# Patient Record
Sex: FEMALE | Race: WHITE | Employment: UNEMPLOYED | URBAN - METROPOLITAN AREA
[De-identification: names, ages, dates, MRNs, and addresses within clinical notes are randomized per-mention and may not be internally consistent; named-entity substitution may affect disease eponyms.]

---

## 2017-10-11 ENCOUNTER — OFFICE VISIT (OUTPATIENT)
Dept: ONCOLOGY | Age: 27
End: 2017-10-11

## 2017-10-11 VITALS
WEIGHT: 214.6 LBS | OXYGEN SATURATION: 97 % | RESPIRATION RATE: 20 BRPM | DIASTOLIC BLOOD PRESSURE: 83 MMHG | TEMPERATURE: 97 F | SYSTOLIC BLOOD PRESSURE: 135 MMHG | HEIGHT: 64 IN | HEART RATE: 77 BPM | BODY MASS INDEX: 36.64 KG/M2

## 2017-10-11 DIAGNOSIS — D72.9 NEUTROPHILIA: Primary | ICD-10-CM

## 2017-10-11 DIAGNOSIS — D72.9 NEUTROPHILIA: ICD-10-CM

## 2017-10-11 DIAGNOSIS — F25.0 SCHIZOAFFECTIVE DISORDER, BIPOLAR TYPE (HCC): ICD-10-CM

## 2017-10-11 DIAGNOSIS — R71.8 MICROCYTOSIS: ICD-10-CM

## 2017-10-11 RX ORDER — HALOPERIDOL 1 MG/1
TABLET ORAL
Refills: 0 | COMMUNITY
Start: 2017-07-26 | End: 2019-10-02

## 2017-10-11 RX ORDER — HALOPERIDOL 5 MG/1
TABLET ORAL
Refills: 0 | COMMUNITY
Start: 2017-09-11 | End: 2020-12-18

## 2017-10-11 RX ORDER — AMLODIPINE BESYLATE 5 MG/1
TABLET ORAL
Refills: 5 | COMMUNITY
Start: 2017-09-26

## 2017-10-11 RX ORDER — BUSPIRONE HYDROCHLORIDE 10 MG/1
TABLET ORAL
Refills: 2 | COMMUNITY
Start: 2017-09-26 | End: 2020-12-18

## 2017-10-11 RX ORDER — SERTRALINE HYDROCHLORIDE 100 MG/1
TABLET, FILM COATED ORAL
Refills: 2 | COMMUNITY
Start: 2017-09-26 | End: 2020-12-18

## 2017-10-11 RX ORDER — LANOLIN ALCOHOL/MO/W.PET/CERES
CREAM (GRAM) TOPICAL
Refills: 5 | COMMUNITY
Start: 2017-09-26

## 2017-10-11 RX ORDER — CLONAZEPAM 0.5 MG/1
TABLET ORAL
Refills: 3 | COMMUNITY
Start: 2017-09-28 | End: 2019-10-02

## 2017-10-11 RX ORDER — HYDROCHLOROTHIAZIDE 12.5 MG/1
CAPSULE ORAL
Refills: 5 | COMMUNITY
Start: 2017-09-26 | End: 2020-12-18

## 2017-10-11 RX ORDER — HALOPERIDOL 2 MG/1
TABLET ORAL
Refills: 0 | COMMUNITY
Start: 2017-08-09 | End: 2019-10-02

## 2017-10-11 RX ORDER — HYDROXYZINE PAMOATE 50 MG/1
CAPSULE ORAL
Refills: 2 | COMMUNITY
Start: 2017-08-09 | End: 2019-10-02

## 2017-10-11 RX ORDER — TRAZODONE HYDROCHLORIDE 150 MG/1
TABLET ORAL
Refills: 2 | COMMUNITY
Start: 2017-09-26 | End: 2019-10-02

## 2017-10-11 RX ORDER — BENZTROPINE MESYLATE 0.5 MG/1
TABLET ORAL
Refills: 2 | COMMUNITY
Start: 2017-09-05 | End: 2020-12-18

## 2017-10-11 NOTE — PROGRESS NOTES
Hematology/Oncology Consult    REASON FOR CONSULT: Neutrophilia  REQUESTED BY: Ken Nascimento     HISTORY OF PRESENT ILLNESS: Ms. Massiel Galloway is a 33 y.o. female with Schizophrenia and HTN who presents for evaluation of neutrophilia. She is on multiple medications for schizophrenia - Cogentin, zoloft, buspar and haldol. Was noted to have a WBC count of 12.5 k on 7/17/17 on a routine physical without other cytopenias and predominant neutrophilia. She presents with her  today who provides some of the history. She denies any fevers, chills, NS, lumps or bumps, rashes, CP, SOB, HA, Diarrhea, abnormal bleeding, vaginal discharge, dysuria. Has gained some weight and appetite is stable,     She takes iron by mouth once a day for several months  Does not smoke  No blood disorders in the family      Past Medical History:   Diagnosis Date    Depression     Iron deficiency anemia     Self-mutilation     Social anxiety disorder        No past surgical history on file. No Known Allergies    Current Outpatient Prescriptions   Medication Sig Dispense Refill    amLODIPine (NORVASC) 5 mg tablet TK 1 T PO QD  5    benztropine (COGENTIN) 0.5 mg tablet TK 1 T PO BID  2    clonazePAM (KLONOPIN) 0.5 mg tablet TK 1 T PO  QD PRN  3    ferrous sulfate 325 mg (65 mg iron) tablet TK 1 T PO 1 TIME A DAY  5    haloperidol (HALDOL) 5 mg tablet TK 1 T PO BID  0    haloperidol (HALDOL) 1 mg tablet TK 1 T PO BID  0    hydroCHLOROthiazide (MICROZIDE) 12.5 mg capsule TK 1 C PO QD  5    hydrOXYzine pamoate (VISTARIL) 50 mg capsule TK 1 C PO BID PRN  2    sertraline (ZOLOFT) 100 mg tablet TK 1 T PO QD  2    traZODone (DESYREL) 150 mg tablet TK 1 T PO HS PRN  2    paliperidone palmitate (INVEGA SUSTENNA) 156 mg/mL injection 117 mg by IntraMUSCular route once.  Every 4 weeks      busPIRone (BUSPAR) 10 mg tablet TK 1 T PO BID  2    haloperidol (HALDOL) 2 mg tablet TK 1 T PO BID  0       Social History     Social History    Marital status: UNKNOWN     Spouse name: N/A    Number of children: N/A    Years of education: N/A     Social History Main Topics    Smoking status: Never Smoker    Smokeless tobacco: None    Alcohol use No    Drug use: None    Sexual activity: Not Asked     Other Topics Concern    None     Social History Narrative     FH reviewed    ROS  A 12 point review of systems was obtained and is negative except as listed in HPI.   ECOG PS is 1      Physical Examination:   Visit Vitals    /83    Pulse 77    Temp 97 °F (36.1 °C)    Resp 20    Ht 5' 4\" (1.626 m)    Wt 214 lb 9.6 oz (97.3 kg)    LMP 08/11/2017 (Approximate)    SpO2 97%    BMI 36.84 kg/m2     General appearance - alert, well appearing, and in no distress, poor eye contact  Mental status - oriented to person, place, and time  Mouth - mucous membranes moist, pharynx normal without lesions  Neck - supple, no significant adenopathy  Lymphatics - no palpable lymphadenopathy, no hepatosplenomegaly  Chest - clear to auscultation, no wheezes, rales or rhonchi, symmetric air entry  Heart - normal rate, regular rhythm, normal S1, S2, no murmurs, rubs, clicks or gallops  Abdomen - soft, nontender, nondistended, no masses or organomegaly, bowel sounds present  Back exam - full range of motion, no tenderness, palpable spasm or pain on motion  Neurological - normal speech, no focal findings or movement disorder noted  Musculoskeletal - no joint tenderness, deformity or swelling  Extremities - peripheral pulses normal, no pedal edema, no clubbing or cyanosis  Skin - normal coloration and turgor, no rashes, no suspicious skin lesions noted    Results     Outside labs, notes and pertinent records reviewed   CBC 7/17/17    Hb 12.4 g/dl  MCV 70  WBC 12.5K  Plts 304k    ASSESSMENT  Ms. Alexandru Saurez is a 33 y.o. female with isolated mild neutrophilia and microcytosis    DISCUSSION  At this time, the etiology is uncertain and the differential diagnosis is quite broad. Neutrophilia can be caused by active cigarette smoking, stress (anxiety, pain, exercise, etc), inflammatory conditions, infection, medications, asplenia, malignancy, and myeloproliferative disorders. Occasionally, patients have chronic idiopathic neutrophilia in which no cause is identified and is a benign condition. 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 It is reassuring that she has no other cytopenias. Will obtain baseline labs to r/o some other etiologies    She also has inappropriate microcytosis which may indicate an underlying thalassemia. If however her iron stores are low in the face of a normal Hb- I will order additional tests to r/o an MPN      PLAN    - CBC diff, smear, hep b, hep c, HIV, LD, Retic, Iron profile    rtc 1 WEEK    Dheeraj Frye MD    Ms. Matute has a reminder for a \"due or due soon\" health maintenance. I have asked that she contact her primary care provider for follow-up on this health maintenance.

## 2017-10-17 ENCOUNTER — TELEPHONE (OUTPATIENT)
Dept: ONCOLOGY | Age: 27
End: 2017-10-17

## 2017-10-17 NOTE — TELEPHONE ENCOUNTER
Call to patient. Follow up appointment 10/18. Mail box full on mobile  Left message to return call on home phone. Lab results not found. Need to reschedule appointment if not drawn.

## 2017-10-18 ENCOUNTER — OFFICE VISIT (OUTPATIENT)
Dept: ONCOLOGY | Age: 27
End: 2017-10-18

## 2017-10-18 VITALS
TEMPERATURE: 97.2 F | OXYGEN SATURATION: 97 % | HEIGHT: 64 IN | BODY MASS INDEX: 36.45 KG/M2 | HEART RATE: 87 BPM | DIASTOLIC BLOOD PRESSURE: 88 MMHG | WEIGHT: 213.5 LBS | RESPIRATION RATE: 20 BRPM | SYSTOLIC BLOOD PRESSURE: 131 MMHG

## 2017-10-18 DIAGNOSIS — R71.8 MICROCYTOSIS: ICD-10-CM

## 2017-10-18 DIAGNOSIS — F25.0 SCHIZOAFFECTIVE DISORDER, BIPOLAR TYPE (HCC): ICD-10-CM

## 2017-10-18 DIAGNOSIS — D72.9 NEUTROPHILIA: Primary | ICD-10-CM

## 2017-10-18 LAB
BASOPHILS # BLD AUTO: 0 X10E3/UL (ref 0–0.2)
BASOPHILS NFR BLD AUTO: 0 %
EOSINOPHIL # BLD AUTO: 0.2 X10E3/UL (ref 0–0.4)
EOSINOPHIL NFR BLD AUTO: 2 %
ERYTHROCYTE [DISTWIDTH] IN BLOOD BY AUTOMATED COUNT: 14.5 % (ref 12.3–15.4)
FERRITIN SERPL-MCNC: 116 NG/ML (ref 15–150)
HBV SURFACE AG SERPL QL IA: NEGATIVE
HCT VFR BLD AUTO: 38.2 % (ref 34–46.6)
HCV AB S/CO SERPL IA: <0.1 S/CO RATIO (ref 0–0.9)
HGB BLD-MCNC: 12.4 G/DL (ref 11.1–15.9)
HIV 1+2 AB+HIV1 P24 AG SERPL QL IA: NON REACTIVE
IMM GRANULOCYTES # BLD: 0.1 X10E3/UL (ref 0–0.1)
IMM GRANULOCYTES NFR BLD: 1 %
IRON SATN MFR SERPL: 17 % (ref 15–55)
IRON SERPL-MCNC: 57 UG/DL (ref 27–159)
LDH SERPL-CCNC: 229 IU/L (ref 119–226)
LYMPHOCYTES # BLD AUTO: 3.2 X10E3/UL (ref 0.7–3.1)
LYMPHOCYTES NFR BLD AUTO: 24 %
MCH RBC QN AUTO: 23 PG (ref 26.6–33)
MCHC RBC AUTO-ENTMCNC: 32.5 G/DL (ref 31.5–35.7)
MCV RBC AUTO: 71 FL (ref 79–97)
MONOCYTES # BLD AUTO: 1.1 X10E3/UL (ref 0.1–0.9)
MONOCYTES NFR BLD AUTO: 9 %
NEUTROPHILS # BLD AUTO: 8.4 X10E3/UL (ref 1.4–7)
NEUTROPHILS NFR BLD AUTO: 64 %
PATH INTERP BLD-IMP: ABNORMAL
PATH REV BLD -IMP: ABNORMAL
PATH REV BLD -IMP: ABNORMAL
PATH REV BLD -IMP: NORMAL
PATHOLOGIST NAME: ABNORMAL
PLATELET # BLD AUTO: 301 X10E3/UL (ref 150–379)
RBC # BLD AUTO: 5.38 X10E6/UL (ref 3.77–5.28)
RETICS/RBC NFR AUTO: 1.3 % (ref 0.6–2.6)
TIBC SERPL-MCNC: 329 UG/DL (ref 250–450)
UIBC SERPL-MCNC: 272 UG/DL (ref 131–425)
VIT B12 SERPL-MCNC: 680 PG/ML (ref 211–946)
WBC # BLD AUTO: 13 X10E3/UL (ref 3.4–10.8)

## 2017-10-18 NOTE — PROGRESS NOTES
Hematology/Oncology follow up    REASON FOR VISIT: Neutrophilia  REQUESTED BY: Adama Trujillo     HISTORY OF PRESENT ILLNESS: Ms. Gigi Porter is a 33 y.o. female with Schizophrenia and HTN who presents for evaluation of neutrophilia. She is on multiple medications for schizophrenia - Cogentin, zoloft, buspar and haldol. Was noted to have a WBC count of 12.5 k on 7/17/17 on a routine physical without other cytopenias and predominant neutrophilia. Presents today to review additional blood work    She presents with her  today who provides some of the history. She denies any fevers, chills, NS, lumps or bumps, rashes, CP, SOB, HA, Diarrhea, abnormal bleeding, vaginal discharge, dysuria. Has gained some weight and appetite is stable, has not mutilated her skin or privates in 2 months though has a h/o this    She takes iron by mouth once a day for several months. Gives her some constipation  Does not smoke  No blood disorders in the family      Past Medical History:   Diagnosis Date    Depression     Iron deficiency anemia     Self-mutilation     Social anxiety disorder        No past surgical history on file. No Known Allergies    Current Outpatient Prescriptions   Medication Sig Dispense Refill    amLODIPine (NORVASC) 5 mg tablet TK 1 T PO QD  5    benztropine (COGENTIN) 0.5 mg tablet TK 1 T PO BID  2    busPIRone (BUSPAR) 10 mg tablet TK 1 T PO BID  2    clonazePAM (KLONOPIN) 0.5 mg tablet TK 1 T PO  QD PRN  3    ferrous sulfate 325 mg (65 mg iron) tablet TK 1 T PO 1 TIME A DAY  5    haloperidol (HALDOL) 5 mg tablet TK 1 T PO BID  0    hydroCHLOROthiazide (MICROZIDE) 12.5 mg capsule TK 1 C PO QD  5    hydrOXYzine pamoate (VISTARIL) 50 mg capsule TK 1 C PO BID PRN  2    sertraline (ZOLOFT) 100 mg tablet TK 1 T PO QD  2    traZODone (DESYREL) 150 mg tablet TK 1 T PO HS PRN  2    paliperidone palmitate (INVEGA SUSTENNA) 156 mg/mL injection 117 mg by IntraMUSCular route once.  Every 4 weeks  haloperidol (HALDOL) 2 mg tablet TK 1 T PO BID  0    haloperidol (HALDOL) 1 mg tablet TK 1 T PO BID  0       Social History     Social History    Marital status: UNKNOWN     Spouse name: N/A    Number of children: N/A    Years of education: N/A     Social History Main Topics    Smoking status: Never Smoker    Smokeless tobacco: Not on file    Alcohol use No    Drug use: Not on file    Sexual activity: Not on file     Other Topics Concern    Not on file     Social History Narrative     FH reviewed    ROS  A 12 point review of systems was obtained and is negative except as listed in HPI. ECOG PS is 1      Physical Examination:   Visit Vitals    /88    Pulse 87    Temp 97.2 °F (36.2 °C)    Resp 20    Ht 5' 4\" (1.626 m)    Wt 213 lb 8 oz (96.8 kg)    LMP 08/11/2017 (Approximate)    SpO2 97%    BMI 36.65 kg/m2     General appearance - alert, well appearing, and in no distress, poor eye contact  Mental status - oriented to person, place, and time  Mouth - mucous membranes moist, pharynx normal without lesions  Neck - supple, no significant adenopathy  Lymphatics - no palpable lymphadenopathy, no hepatosplenomegaly  Chest - clear to auscultation, no wheezes, rales or rhonchi, symmetric air entry  Extremities - peripheral pulses normal, no pedal edema, no clubbing or cyanosis  Skin - normal coloration and turgor, no rashes, no suspicious skin lesions noted    Results     Outside labs, notes and pertinent records reviewed   CBC 7/17/17    Hb 12.4 g/dl  MCV 70  WBC 12.5K  Plts 304k    10/11/17  Hb 12.4 g/dl  MCV 71  WBC 13K  Neutrophils 8400  Lymphs 3200    No immature cells    Iron profile:- TIBC 329, Iron sat 17%  Ferritin 116  Retic 1.3%  HIV  And Hep C negative, Hep b Negative          ASSESSMENT  Ms. Leonard Oconnell is a 33 y.o. female with isolated mild neutrophilia and microcytosis    DISCUSSION  Reviewed labs    She has mild iron deficiency without anemia and stable neutrophilia. Lymphocytes elevated slightly as well. No clear reactive etiology though she does have a h/o injuring self. 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 This does not lead to a risk of infections. Discussed that though we have not r/o an MPN this is unlikely. Will followover time and if concerning changes seen will consider a BM biopsy      PLAN    - CBC diff, iron profile, ferritin and CMP in 3 moths    Justin Zendejas MD    Ms. Matute has a reminder for a \"due or due soon\" health maintenance. I have asked that she contact her primary care provider for follow-up on this health maintenance.

## 2017-10-20 DIAGNOSIS — D50.8 OTHER IRON DEFICIENCY ANEMIA: ICD-10-CM

## 2017-10-20 DIAGNOSIS — D50.8 OTHER IRON DEFICIENCY ANEMIA: Primary | ICD-10-CM

## 2017-10-23 ENCOUNTER — DOCUMENTATION ONLY (OUTPATIENT)
Dept: ONCOLOGY | Age: 27
End: 2017-10-23

## 2018-01-04 LAB
ALBUMIN SERPL-MCNC: 4.7 G/DL (ref 3.5–5.5)
ALBUMIN/GLOB SERPL: 1.6 {RATIO} (ref 1.2–2.2)
ALP SERPL-CCNC: 79 IU/L (ref 39–117)
ALT SERPL-CCNC: 20 IU/L (ref 0–32)
AST SERPL-CCNC: 14 IU/L (ref 0–40)
BASOPHILS # BLD AUTO: 0 X10E3/UL (ref 0–0.2)
BASOPHILS NFR BLD AUTO: 0 %
BILIRUB SERPL-MCNC: <0.2 MG/DL (ref 0–1.2)
BUN SERPL-MCNC: 9 MG/DL (ref 6–20)
BUN/CREAT SERPL: 10 (ref 9–23)
CALCIUM SERPL-MCNC: 10 MG/DL (ref 8.7–10.2)
CHLORIDE SERPL-SCNC: 97 MMOL/L (ref 96–106)
CO2 SERPL-SCNC: 28 MMOL/L (ref 18–29)
CREAT SERPL-MCNC: 0.86 MG/DL (ref 0.57–1)
EOSINOPHIL # BLD AUTO: 0.1 X10E3/UL (ref 0–0.4)
EOSINOPHIL NFR BLD AUTO: 1 %
ERYTHROCYTE [DISTWIDTH] IN BLOOD BY AUTOMATED COUNT: 15.8 % (ref 12.3–15.4)
FERRITIN SERPL-MCNC: 163 NG/ML (ref 15–150)
GLOBULIN SER CALC-MCNC: 3 G/DL (ref 1.5–4.5)
GLUCOSE SERPL-MCNC: 82 MG/DL (ref 65–99)
HCT VFR BLD AUTO: 40.4 % (ref 34–46.6)
HGB BLD-MCNC: 12.9 G/DL (ref 11.1–15.9)
IMM GRANULOCYTES # BLD: 0 X10E3/UL (ref 0–0.1)
IMM GRANULOCYTES NFR BLD: 1 %
IRON SATN MFR SERPL: 14 % (ref 15–55)
IRON SERPL-MCNC: 49 UG/DL (ref 27–159)
LYMPHOCYTES # BLD AUTO: 3.2 X10E3/UL (ref 0.7–3.1)
LYMPHOCYTES NFR BLD AUTO: 24 %
MCH RBC QN AUTO: 22.6 PG (ref 26.6–33)
MCHC RBC AUTO-ENTMCNC: 31.9 G/DL (ref 31.5–35.7)
MCV RBC AUTO: 71 FL (ref 79–97)
MONOCYTES # BLD AUTO: 0.7 X10E3/UL (ref 0.1–0.9)
MONOCYTES NFR BLD AUTO: 6 %
MORPHOLOGY BLD-IMP: ABNORMAL
NEUTROPHILS # BLD AUTO: 9 X10E3/UL (ref 1.4–7)
NEUTROPHILS NFR BLD AUTO: 68 %
PLATELET # BLD AUTO: 347 X10E3/UL (ref 150–379)
POTASSIUM SERPL-SCNC: 4.4 MMOL/L (ref 3.5–5.2)
PROT SERPL-MCNC: 7.7 G/DL (ref 6–8.5)
RBC # BLD AUTO: 5.71 X10E6/UL (ref 3.77–5.28)
SODIUM SERPL-SCNC: 141 MMOL/L (ref 134–144)
TIBC SERPL-MCNC: 353 UG/DL (ref 250–450)
UIBC SERPL-MCNC: 304 UG/DL (ref 131–425)
WBC # BLD AUTO: 13 X10E3/UL (ref 3.4–10.8)

## 2018-01-09 LAB
ALBUMIN SERPL-MCNC: 4.6 G/DL (ref 3.5–5.5)
ALBUMIN/GLOB SERPL: 1.3 {RATIO} (ref 1.2–2.2)
ALP SERPL-CCNC: 84 IU/L (ref 39–117)
ALT SERPL-CCNC: 18 IU/L (ref 0–32)
AST SERPL-CCNC: 20 IU/L (ref 0–40)
BASOPHILS # BLD AUTO: 0 X10E3/UL (ref 0–0.2)
BASOPHILS NFR BLD AUTO: 0 %
BILIRUB SERPL-MCNC: <0.2 MG/DL (ref 0–1.2)
BUN SERPL-MCNC: 10 MG/DL (ref 6–20)
BUN/CREAT SERPL: 13 (ref 9–23)
CALCIUM SERPL-MCNC: 9.5 MG/DL (ref 8.7–10.2)
CHLORIDE SERPL-SCNC: 95 MMOL/L (ref 96–106)
CO2 SERPL-SCNC: 24 MMOL/L (ref 18–29)
CREAT SERPL-MCNC: 0.77 MG/DL (ref 0.57–1)
EOSINOPHIL # BLD AUTO: 0.1 X10E3/UL (ref 0–0.4)
EOSINOPHIL NFR BLD AUTO: 1 %
ERYTHROCYTE [DISTWIDTH] IN BLOOD BY AUTOMATED COUNT: 15.9 % (ref 12.3–15.4)
FERRITIN SERPL-MCNC: 213 NG/ML (ref 15–150)
GLOBULIN SER CALC-MCNC: 3.5 G/DL (ref 1.5–4.5)
GLUCOSE SERPL-MCNC: 99 MG/DL (ref 65–99)
HCT VFR BLD AUTO: 41.4 % (ref 34–46.6)
HGB BLD-MCNC: 13.1 G/DL (ref 11.1–15.9)
IMM GRANULOCYTES # BLD: 0 X10E3/UL (ref 0–0.1)
IMM GRANULOCYTES NFR BLD: 0 %
IRON SATN MFR SERPL: 13 % (ref 15–55)
IRON SERPL-MCNC: 41 UG/DL (ref 27–159)
LYMPHOCYTES # BLD AUTO: 2.9 X10E3/UL (ref 0.7–3.1)
LYMPHOCYTES NFR BLD AUTO: 21 %
MCH RBC QN AUTO: 23 PG (ref 26.6–33)
MCHC RBC AUTO-ENTMCNC: 31.6 G/DL (ref 31.5–35.7)
MCV RBC AUTO: 73 FL (ref 79–97)
MONOCYTES # BLD AUTO: 0.7 X10E3/UL (ref 0.1–0.9)
MONOCYTES NFR BLD AUTO: 5 %
NEUTROPHILS # BLD AUTO: 9.8 X10E3/UL (ref 1.4–7)
NEUTROPHILS NFR BLD AUTO: 73 %
PLATELET # BLD AUTO: 318 X10E3/UL (ref 150–379)
POTASSIUM SERPL-SCNC: 3.9 MMOL/L (ref 3.5–5.2)
PROT SERPL-MCNC: 8.1 G/DL (ref 6–8.5)
RBC # BLD AUTO: 5.7 X10E6/UL (ref 3.77–5.28)
SODIUM SERPL-SCNC: 139 MMOL/L (ref 134–144)
TIBC SERPL-MCNC: 318 UG/DL (ref 250–450)
UIBC SERPL-MCNC: 277 UG/DL (ref 131–425)
WBC # BLD AUTO: 13.5 X10E3/UL (ref 3.4–10.8)

## 2018-01-16 ENCOUNTER — OFFICE VISIT (OUTPATIENT)
Dept: ONCOLOGY | Age: 28
End: 2018-01-16

## 2018-01-16 VITALS
WEIGHT: 209.6 LBS | TEMPERATURE: 97.7 F | DIASTOLIC BLOOD PRESSURE: 80 MMHG | BODY MASS INDEX: 35.78 KG/M2 | OXYGEN SATURATION: 96 % | HEIGHT: 64 IN | RESPIRATION RATE: 16 BRPM | SYSTOLIC BLOOD PRESSURE: 125 MMHG | HEART RATE: 74 BPM

## 2018-01-16 DIAGNOSIS — F25.0 SCHIZOAFFECTIVE DISORDER, BIPOLAR TYPE (HCC): ICD-10-CM

## 2018-01-16 DIAGNOSIS — R71.8 MICROCYTOSIS: ICD-10-CM

## 2018-01-16 DIAGNOSIS — D72.9 NEUTROPHILIA: Primary | ICD-10-CM

## 2018-01-16 PROBLEM — F33.9 RECURRENT DEPRESSION (HCC): Status: ACTIVE | Noted: 2018-01-16

## 2018-01-16 NOTE — PROGRESS NOTES
Hematology/Oncology follow up    REASON FOR VISIT: Neutrophilia  REQUESTED BY: Ciara Rodriguez     HISTORY OF PRESENT ILLNESS: Ms. Mulugeta Jean is a 33 y.o. female with Schizophrenia and HTN who presents for follow up of neutrophilia. She is on multiple medications for schizophrenia - Cogentin, zoloft, buspar and haldol. Was noted to have a WBC count of 12.5 k on 7/17/17 on a routine physical without other cytopenias and predominant neutrophilia. Presents today to review additional blood work    She presents alone today. She denies any fevers, chills, NS, lumps or bumps, rashes, CP, SOB, HA, Diarrhea, abnormal bleeding, vaginal discharge, dysuria. Has gained some weight and appetite is stable, has not mutilated her skin or privates in 4 months though has a h/o this    She takes iron by mouth once a day for several months. Gives her some constipation  Does not smoke  No blood disorders in the family      Past Medical History:   Diagnosis Date    Depression     Iron deficiency anemia     Self-mutilation     Social anxiety disorder        No past surgical history on file. No Known Allergies    Current Outpatient Prescriptions   Medication Sig Dispense Refill    amLODIPine (NORVASC) 5 mg tablet TK 1 T PO QD  5    benztropine (COGENTIN) 0.5 mg tablet TK 1 T PO BID  2    busPIRone (BUSPAR) 10 mg tablet TK 1 T PO BID  2    clonazePAM (KLONOPIN) 0.5 mg tablet TK 1 T PO  QD PRN  3    ferrous sulfate 325 mg (65 mg iron) tablet TK 1 T PO 1 TIME A DAY  5    haloperidol (HALDOL) 5 mg tablet TK 1 T PO BID  0    hydroCHLOROthiazide (MICROZIDE) 12.5 mg capsule TK 1 C PO QD  5    sertraline (ZOLOFT) 100 mg tablet TK 1 T PO QD  2    traZODone (DESYREL) 150 mg tablet TK 1 T PO HS PRN  2    paliperidone palmitate (INVEGA SUSTENNA) 156 mg/mL injection 117 mg by IntraMUSCular route once.  Every 4 weeks      haloperidol (HALDOL) 2 mg tablet TK 1 T PO BID  0    haloperidol (HALDOL) 1 mg tablet TK 1 T PO BID  0    hydrOXYzine pamoate (VISTARIL) 50 mg capsule TK 1 C PO BID PRN  2       Social History     Social History    Marital status: UNKNOWN     Spouse name: N/A    Number of children: N/A    Years of education: N/A     Social History Main Topics    Smoking status: Never Smoker    Smokeless tobacco: Never Used    Alcohol use No    Drug use: None    Sexual activity: Not Asked     Other Topics Concern    None     Social History Narrative     FH reviewed    ROS  A  review of systems was obtained and is negative except as listed in HPI.   ECOG PS is 1      Physical Examination:   Visit Vitals    /80    Pulse 74    Temp 97.7 °F (36.5 °C)    Resp 16    Ht 5' 4\" (1.626 m)    Wt 209 lb 9.6 oz (95.1 kg)    LMP 11/01/2017 (Approximate)    SpO2 96%    BMI 35.98 kg/m2     General appearance - alert, well appearing, and in no distress, poor eye contact  Mental status - oriented to person, place, and time  Mouth - mucous membranes moist, pharynx normal without lesions  Neck - supple, no significant adenopathy  Lymphatics - no palpable lymphadenopathy, no hepatosplenomegaly  Chest - clear to auscultation, no wheezes, rales or rhonchi, symmetric air entry  Extremities - peripheral pulses normal, no pedal edema, no clubbing or cyanosis  Skin - normal coloration and turgor, no rashes, no suspicious skin lesions noted    Results     Outside labs, notes and pertinent records reviewed   CBC 7/17/17    Hb 12.4 g/dl  MCV 70  WBC 12.5K  Plts 304k    10/11/17  Hb 12.4 g/dl  MCV 71  WBC 13K  Neutrophils 8400  Lymphs 3200    No immature cells    Iron profile:- TIBC 329, Iron sat 17%  Ferritin 116  Retic 1.3%  HIV  And Hep C negative, Hep b Negative        1/8/18    Lab Results   Component Value Date/Time    Reticulocyte count 1.3 10/11/2017 02:59 PM    Iron % saturation 13 01/08/2018 12:00 AM    TIBC 318 01/08/2018 12:00 AM    Ferritin 213 01/08/2018 12:00 AM    Vitamin B12 680 10/11/2017 02:59 PM     10/11/2017 02:59 PM    Hep C Virus Ab <0.1 10/11/2017 02:59 PM    HIV SCREEN 4TH GENERATION WRFX Non Reactive 10/11/2017 02:59 PM     Has stable leukocytosis with no other CBC abnormalities. ASSESSMENT  Ms. Kavya Gusman is a 33 y.o. female with isolated mild neutrophilia and microcytosis. Neutrophilia has been stable now for 6 months. DISCUSSION  Reviewed labs    She has mild iron deficiency without anemia and stable neutrophilia. No clear reactive etiology though she does have a h/o injuring self. 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 This does not lead to a risk of infections. Discussed that though we have not r/o an MPN this is unlikely. Will followover time and if concerning changes seen will co nsider a BM biopsy. Noted that she has some microcytosis in the absence of anemia. She may have an underlying minor hemoglobinopathy. PLAN    -We will up with CBC diff  in 6 months    Ortiz Garcia MD    Ms. Matute has a reminder for a \"due or due soon\" health maintenance. I have asked that she contact her primary care provider for follow-up on this health maintenance.

## 2018-03-01 ENCOUNTER — HOSPITAL ENCOUNTER (OUTPATIENT)
Dept: ULTRASOUND IMAGING | Age: 28
Discharge: HOME | End: 2018-03-01
Payer: MEDICAID

## 2018-03-01 DIAGNOSIS — R22.9 SKIN SWELLING: ICD-10-CM

## 2018-03-01 PROCEDURE — 76882 US LMTD JT/FCL EVL NVASC XTR: CPT

## 2018-06-15 LAB
BASOPHILS # BLD AUTO: 0 X10E3/UL (ref 0–0.2)
BASOPHILS NFR BLD AUTO: 0 %
EOSINOPHIL # BLD AUTO: 0.2 X10E3/UL (ref 0–0.4)
EOSINOPHIL NFR BLD AUTO: 2 %
ERYTHROCYTE [DISTWIDTH] IN BLOOD BY AUTOMATED COUNT: 15.8 % (ref 12.3–15.4)
HCT VFR BLD AUTO: 40.6 % (ref 34–46.6)
HGB BLD-MCNC: 13 G/DL (ref 11.1–15.9)
IMM GRANULOCYTES # BLD: 0 X10E3/UL (ref 0–0.1)
IMM GRANULOCYTES NFR BLD: 0 %
LYMPHOCYTES # BLD AUTO: 3.3 X10E3/UL (ref 0.7–3.1)
LYMPHOCYTES NFR BLD AUTO: 28 %
MCH RBC QN AUTO: 23 PG (ref 26.6–33)
MCHC RBC AUTO-ENTMCNC: 32 G/DL (ref 31.5–35.7)
MCV RBC AUTO: 72 FL (ref 79–97)
MONOCYTES # BLD AUTO: 0.8 X10E3/UL (ref 0.1–0.9)
MONOCYTES NFR BLD AUTO: 7 %
NEUTROPHILS # BLD AUTO: 7.5 X10E3/UL (ref 1.4–7)
NEUTROPHILS NFR BLD AUTO: 63 %
PLATELET # BLD AUTO: 283 X10E3/UL (ref 150–379)
RBC # BLD AUTO: 5.65 X10E6/UL (ref 3.77–5.28)
WBC # BLD AUTO: 11.8 X10E3/UL (ref 3.4–10.8)

## 2018-06-21 ENCOUNTER — OFFICE VISIT (OUTPATIENT)
Dept: ONCOLOGY | Age: 28
End: 2018-06-21

## 2018-06-21 VITALS
HEART RATE: 85 BPM | RESPIRATION RATE: 20 BRPM | SYSTOLIC BLOOD PRESSURE: 124 MMHG | TEMPERATURE: 98 F | BODY MASS INDEX: 35.44 KG/M2 | OXYGEN SATURATION: 95 % | HEIGHT: 64 IN | WEIGHT: 207.6 LBS | DIASTOLIC BLOOD PRESSURE: 84 MMHG

## 2018-06-21 DIAGNOSIS — D72.9 NEUTROPHILIA: ICD-10-CM

## 2018-06-21 DIAGNOSIS — E66.01 SEVERE OBESITY (BMI 35.0-39.9): Primary | ICD-10-CM

## 2018-06-21 DIAGNOSIS — F25.0 SCHIZOAFFECTIVE DISORDER, BIPOLAR TYPE (HCC): ICD-10-CM

## 2018-06-21 DIAGNOSIS — R71.8 MICROCYTOSIS: ICD-10-CM

## 2018-06-21 DIAGNOSIS — F33.9 RECURRENT DEPRESSION (HCC): ICD-10-CM

## 2018-06-21 NOTE — PROGRESS NOTES
Hematology/Oncology follow up    REASON FOR VISIT: Neutrophilia  REQUESTED BY: May Beasley     HISTORY OF PRESENT ILLNESS: Ms. Brendan Galvan is a 33 y.o. female with Schizophrenia and HTN who presents for follow up of neutrophilia. She is on multiple medications for schizophrenia - Cogentin, zoloft, buspar and haldol. Was noted to have a WBC count of 12.5 k on 7/17/17 on a routine physical without other cytopenias and predominant neutrophilia. Counts have been stable and blood work unrevealing and hence being observed. Presents today for follow up. She presents alone today. She denies any fevers, chills, NS, lumps or bumps, rashes, CP, SOB, HA, Diarrhea, abnormal bleeding, vaginal discharge, dysuria. Thinks she has HPV    She takes iron by mouth twice a day for several months. Gives her some constipation  Does not smoke  No blood disorders in the family      Past Medical History:   Diagnosis Date    Depression     Iron deficiency anemia     Self-mutilation     Social anxiety disorder        No past surgical history on file. No Known Allergies    Current Outpatient Prescriptions   Medication Sig Dispense Refill    amLODIPine (NORVASC) 5 mg tablet TK 1 T PO QD  5    benztropine (COGENTIN) 0.5 mg tablet TK 1 T PO BID  2    busPIRone (BUSPAR) 10 mg tablet TK 1 T PO BID  2    clonazePAM (KLONOPIN) 0.5 mg tablet TK 1 T PO  QD PRN  3    ferrous sulfate 325 mg (65 mg iron) tablet TK 1 T PO 1 TIME A DAY  5    haloperidol (HALDOL) 5 mg tablet TK 1 T PO BID  0    hydroCHLOROthiazide (MICROZIDE) 12.5 mg capsule TK 1 C PO QD  5    sertraline (ZOLOFT) 100 mg tablet TK 1 T PO QD  2    traZODone (DESYREL) 150 mg tablet TK 1 T PO HS PRN  2    paliperidone palmitate (INVEGA SUSTENNA) 156 mg/mL injection 117 mg by IntraMUSCular route once.  Every 4 weeks      haloperidol (HALDOL) 2 mg tablet TK 1 T PO BID  0    haloperidol (HALDOL) 1 mg tablet TK 1 T PO BID  0    hydrOXYzine pamoate (VISTARIL) 50 mg capsule TK 1 C PO BID PRN  2       Social History     Social History    Marital status: SINGLE     Spouse name: N/A    Number of children: N/A    Years of education: N/A     Social History Main Topics    Smoking status: Never Smoker    Smokeless tobacco: Never Used    Alcohol use No    Drug use: None    Sexual activity: Not Asked     Other Topics Concern    None     Social History Narrative     FH reviewed    ROS  A  review of systems was obtained and is negative except as listed in HPI.   ECOG PS is 1      Physical Examination:   Visit Vitals    /84    Pulse 85    Temp 98 °F (36.7 °C)    Resp 20    Ht 5' 4\" (1.626 m)    Wt 207 lb 9.6 oz (94.2 kg)    SpO2 95%    BMI 35.63 kg/m2     General appearance - alert, well appearing, and in no distress, poor eye contact  Mental status - oriented to person, place, and time  Mouth - mucous membranes moist, pharynx normal without lesions  Neck - supple, no significant adenopathy  Lymphatics - no palpable lymphadenopathy, no hepatosplenomegaly  Chest - clear to auscultation, no wheezes, rales or rhonchi, symmetric air entry  Extremities - peripheral pulses normal, no pedal edema, no clubbing or cyanosis  Skin - normal coloration and turgor, no rashes, no suspicious skin lesions noted    Results     Outside labs, notes and pertinent records reviewed   CBC 7/17/17    Hb 12.4 g/dl  MCV 70  WBC 12.5K  Plts 304k    10/11/17  Hb 12.4 g/dl  MCV 71  WBC 13K  Neutrophils 8400  Lymphs 3200    No immature cells    Iron profile:- TIBC 329, Iron sat 17%  Ferritin 116  Retic 1.3%  HIV  And Hep C negative, Hep b Negative        1/8/18    Lab Results   Component Value Date/Time    Reticulocyte count 1.3 10/11/2017 02:59 PM    Iron % saturation 13 (L) 01/08/2018 12:00 AM    TIBC 318 01/08/2018 12:00 AM    Ferritin 213 (H) 01/08/2018 12:00 AM    Vitamin B12 680 10/11/2017 02:59 PM     (H) 10/11/2017 02:59 PM    Hep C Virus Ab <0.1 10/11/2017 02:59 PM    HIV SCREEN 4TH GENERATION WRFX Non Reactive 10/11/2017 02:59 PM     Recent Labs      06/14/18   1013  01/08/18   0000  01/03/18   1424  10/11/17   1459   WBC  11.8*  13.5*  13.0*  13.0*   HGB  13.0  13.1  12.9  12.4   HCT  40.6  41.4  40.4  38.2   PLT  283  318  347  301   MCV  72*  73*  71*  71*   ANEU  7.5*  9.8*  9.0*  8.4*         ASSESSMENT  Ms. Julienne Fabry is a 33 y.o. female with isolated mild neutrophilia and microcytosis. Neutrophilia has been stable now for 8 months. DISCUSSION  Reviewed labs    She has mild iron deficiency without anemia and stable neutrophilia. No clear reactive etiology. 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 This does not lead to a risk of infections. Discussed that though we have not r/o an MPN this is unlikely. Noted that she has some microcytosis in the absence of anemia. Borderline iron deficiency (iron sat 13%, ferritin however is normal), however she may have an underlying minor hemoglobinopathy. If and when she is pregnant she may need to have testing to r/o a hemoglobinopathy    I do not think we need a B,M biopsy given that WBC actually has decreased some    She will follow with her PCP with annual cbc. If there are new cytopenias or unexplained rise in WBC count will be happy to see her back    PLAN    RTC prjoseph Pike MD    Ms. Matute has a reminder for a \"due or due soon\" health maintenance. I have asked that she contact her primary care provider for follow-up on this health maintenance.

## 2019-04-23 ENCOUNTER — OFFICE VISIT (OUTPATIENT)
Dept: OBGYN CLINIC | Age: 29
End: 2019-04-23

## 2019-04-23 VITALS — WEIGHT: 220 LBS | BODY MASS INDEX: 37.76 KG/M2

## 2019-04-23 DIAGNOSIS — Z01.419 ENCOUNTER FOR GYNECOLOGICAL EXAMINATION WITHOUT ABNORMAL FINDING: Primary | ICD-10-CM

## 2019-04-23 NOTE — PROGRESS NOTES
Annual exam ages 25-60    Bobo Almaraz is a No obstetric history on file. ,  33 y.o. female [facility] [place] No LMP recorded. .    She presents for her annual checkup. She is having no significant problems. Concerned that she might have HPV; no hx HPV    With regard to the Gardasil vaccine, she has received all 3 injections. Menstrual status:    Her periods are light in flow. She is using one to two pads or tampons per day, usually regular and last 26-30 days. She denies dysmenorrhea. She reports no premenstrual symptoms. Contraception:    The current method of family planning is abstinence. Sexual history:     She  has no sexual activity history on file. .    Medical conditions:    Since her last annual GYN exam about a couple years ago, she has not the following changes in her health history: none. Pap and Mammogram History:    Her most recent Pap smear was normal, obtained 2 year(s) ago. Breast Cancer History/Substance Abuse: negative    Past Medical History:   Diagnosis Date    Depression     Hypertension     Iron deficiency anemia     Self-mutilation     Social anxiety disorder      Past Surgical History:   Procedure Laterality Date    HX OTHER SURGICAL      right shoulder surgery.        Current Outpatient Medications   Medication Sig Dispense Refill    amLODIPine (NORVASC) 5 mg tablet TK 1 T PO QD  5    busPIRone (BUSPAR) 10 mg tablet TK 1 T PO BID  2    benztropine (COGENTIN) 0.5 mg tablet TK 1 T PO BID  2    clonazePAM (KLONOPIN) 0.5 mg tablet TK 1 T PO  QD PRN  3    ferrous sulfate 325 mg (65 mg iron) tablet TK 1 T PO 1 TIME A DAY  5    haloperidol (HALDOL) 5 mg tablet TK 1 T PO BID  0    haloperidol (HALDOL) 2 mg tablet TK 1 T PO BID  0    haloperidol (HALDOL) 1 mg tablet TK 1 T PO BID  0    hydroCHLOROthiazide (MICROZIDE) 12.5 mg capsule TK 1 C PO QD  5    hydrOXYzine pamoate (VISTARIL) 50 mg capsule TK 1 C PO BID PRN  2    sertraline (ZOLOFT) 100 mg tablet TK 1 T PO QD  2    traZODone (DESYREL) 150 mg tablet TK 1 T PO HS PRN  2    paliperidone palmitate (INVEGA SUSTENNA) 156 mg/mL injection 117 mg by IntraMUSCular route once. Every 4 weeks       Allergies: Patient has no known allergies. Tobacco History:  reports that she has never smoked. She has never used smokeless tobacco.  Alcohol Abuse:  reports that she does not drink alcohol. Drug Abuse:  has no drug history on file. Family Medical/Cancer History: History reviewed. No pertinent family history.      Review of Systems - History obtained from the patient  Constitutional: negative for weight loss, fever, night sweats  HEENT: negative for hearing loss, earache, congestion, snoring, sorethroat  CV: negative for chest pain, palpitations, edema  Resp: negative for cough, shortness of breath, wheezing  GI: negative for change in bowel habits, abdominal pain, black or bloody stools  : negative for frequency, dysuria, hematuria, vaginal discharge  MSK: negative for back pain, joint pain, muscle pain  Breast: negative for breast lumps, nipple discharge, galactorrhea  Skin :negative for itching, rash, hives  Neuro: negative for dizziness, headache, confusion, weakness  Psych: negative for anxiety, depression, change in mood  Heme/lymph: negative for bleeding, bruising, pallor    Physical Exam    Visit Vitals  Wt 220 lb (99.8 kg)   BMI 37.76 kg/m²       Constitutional  · Appearance: well-nourished, well developed, alert, in no acute distress    HENT  · Head and Face: appears normal    Chest  · Respiratory Effort: breathing unlabored    Breasts  · Inspection of Breasts: breasts symmetrical, no skin changes, no discharge present, nipple appearance normal, no skin retraction present  · Palpation of Breasts and Axillae: no masses present on palpation, no breast tenderness  · Axillary Lymph Nodes: no lymphadenopathy present    Gastrointestinal  · Abdominal Examination: abdomen non-tender to palpation, normal bowel sounds, no masses present  · Liver and spleen: no hepatomegaly present, spleen not palpable  · Hernias: no hernias identified    Genitourinary  · External Genitalia: normal appearance for age, no discharge present, no tenderness present, no inflammatory lesions present, no masses present, no atrophy present  · Vagina: normal vaginal vault without central or paravaginal defects, no discharge present, no inflammatory lesions present, no masses present  · Bladder: non-tender to palpation  · Urethra: appears normal  · Cervix: normal   · Uterus: normal size, shape and consistency  · Adnexa: no adnexal tenderness present, no adnexal masses present  · Perineum: perineum within normal limits, no evidence of trauma, no rashes or skin lesions present  · Anus: anus within normal limits, no hemorrhoids present  · Inguinal Lymph Nodes: no lymphadenopathy present    Skin  · General Inspection: no rash, no lesions identified    Neurologic/Psychiatric  · Mental Status:  · Orientation: grossly oriented to person, place and time  · Mood and Affect: mood normal, affect appropriate    . Assessment:  Routine gynecologic examination  S/p Gardisil injections; no current evidence of HPV  Her current medical status is satisfactory with no evidence of significant gynecologic issues.     Plan:  GC/ chlamydia offered and desired  Pap today  Counseled re: diet, exercise, healthy lifestyle  Return for yearly wellness visits  Gardisil counseling provided  Rec screening mammo at either 36 or 41

## 2019-04-25 LAB
C TRACH RRNA CVX QL NAA+PROBE: NEGATIVE
CYTOLOGIST CVX/VAG CYTO: NORMAL
CYTOLOGY CVX/VAG DOC THIN PREP: NORMAL
DX ICD CODE: NORMAL
LABCORP, 190119: NORMAL
Lab: NORMAL
N GONORRHOEA RRNA CVX QL NAA+PROBE: NEGATIVE
OTHER STN SPEC: NORMAL
PATH REPORT.FINAL DX SPEC: NORMAL
STAT OF ADQ CVX/VAG CYTO-IMP: NORMAL
T VAGINALIS RRNA SPEC QL NAA+PROBE: NEGATIVE

## 2019-10-02 ENCOUNTER — OFFICE VISIT (OUTPATIENT)
Dept: OBGYN CLINIC | Age: 29
End: 2019-10-02

## 2019-10-02 VITALS
WEIGHT: 187.5 LBS | SYSTOLIC BLOOD PRESSURE: 126 MMHG | DIASTOLIC BLOOD PRESSURE: 86 MMHG | HEIGHT: 64 IN | BODY MASS INDEX: 32.01 KG/M2

## 2019-10-02 DIAGNOSIS — N76.0 BV (BACTERIAL VAGINOSIS): Primary | ICD-10-CM

## 2019-10-02 DIAGNOSIS — B96.89 BV (BACTERIAL VAGINOSIS): Primary | ICD-10-CM

## 2019-10-02 RX ORDER — PRAZOSIN HYDROCHLORIDE 1 MG/1
CAPSULE ORAL
Status: ON HOLD | COMMUNITY
Start: 2019-10-01 | End: 2020-12-18 | Stop reason: SDUPTHER

## 2019-10-02 RX ORDER — ARIPIPRAZOLE 400 MG
KIT INTRAMUSCULAR
COMMUNITY
Start: 2019-09-21 | End: 2020-12-18

## 2019-10-02 RX ORDER — ARIPIPRAZOLE 10 MG/1
TABLET ORAL
COMMUNITY
End: 2020-12-18

## 2019-10-02 NOTE — PROGRESS NOTES
Joy Alvarado is a 30 y.o. female who would like to be evaluated to see if recent BV is cleared. Currently asx. Would also like to discuss starting birth control. Options reviewed; will update with decision    Her current method of family planning is abstinence. The patient is not currently sexually active. Her relevant past medical history:   Past Medical History:   Diagnosis Date    Depression     Hypertension     Iron deficiency anemia     Self-mutilation     Social anxiety disorder         Past Surgical History:   Procedure Laterality Date    HX OTHER SURGICAL      right shoulder surgery. Social History     Occupational History    Not on file   Tobacco Use    Smoking status: Never Smoker    Smokeless tobacco: Never Used   Substance and Sexual Activity    Alcohol use: No    Drug use: Not on file    Sexual activity: Not on file     No family history on file. No Known Allergies  Prior to Admission medications    Medication Sig Start Date End Date Taking?  Authorizing Provider   amLODIPine (NORVASC) 5 mg tablet TK 1 T PO QD 9/26/17   Provider, Historical   benztropine (COGENTIN) 0.5 mg tablet TK 1 T PO BID 9/5/17   Provider, Historical   busPIRone (BUSPAR) 10 mg tablet TK 1 T PO BID 9/26/17   Provider, Historical   clonazePAM (KLONOPIN) 0.5 mg tablet TK 1 T PO  QD PRN 9/28/17   Provider, Historical   ferrous sulfate 325 mg (65 mg iron) tablet TK 1 T PO 1 TIME A DAY 9/26/17   Provider, Historical   haloperidol (HALDOL) 5 mg tablet TK 1 T PO BID 9/11/17   Provider, Historical   haloperidol (HALDOL) 2 mg tablet TK 1 T PO BID 8/9/17   Provider, Historical   haloperidol (HALDOL) 1 mg tablet TK 1 T PO BID 7/26/17   Provider, Historical   hydroCHLOROthiazide (MICROZIDE) 12.5 mg capsule TK 1 C PO QD 9/26/17   Provider, Historical   hydrOXYzine pamoate (VISTARIL) 50 mg capsule TK 1 C PO BID PRN 8/9/17   Provider, Historical   sertraline (ZOLOFT) 100 mg tablet TK 1 T PO QD 9/26/17 Provider, Historical   traZODone (DESYREL) 150 mg tablet TK 1 T PO HS PRN 9/26/17   Provider, Historical   paliperidone palmitate (INVEGA SUSTENNA) 156 mg/mL injection 117 mg by IntraMUSCular route once.  Every 4 weeks    Provider, Historical        Review of Systems - History obtained from the patient  Constitutional: negative for weight loss, fever, night sweats  HEENT: negative for hearing loss, earache, congestion, snoring, sorethroat  CV: negative for chest pain, palpitations, edema  Resp: negative for cough, shortness of breath, wheezing  Breast: negative for breast lumps, nipple discharge, galactorrhea  GI: negative for change in bowel habits, abdominal pain, black or bloody stools  : negative for frequency, dysuria, hematuria  MSK: negative for back pain, joint pain, muscle pain  Skin: negative for itching, rash, hives  Neuro: negative for dizziness, headache, confusion, weakness  Psych: negative for anxiety, depression, change in mood  Heme/lymph: negative for bleeding, bruising, pallor      Objective:  Visit Vitals  Ht 5' 4\" (1.626 m)   Wt 187 lb 8 oz (85 kg)   BMI 32.18 kg/m²          PHYSICAL EXAMINATION    Constitutional  · Appearance: well-nourished, well developed, alert, in no acute distress    HENT  · Head and Face: appears normal    Gastrointestinal  · Abdominal Examination: abdomen non-tender to palpation, normal bowel sounds, no masses present  · Liver and spleen: no hepatomegaly present, spleen not palpable  · Hernias: no hernias identified    Genitourinary  · External Genitalia: normal appearance for age, no discharge present, no tenderness present, no inflammatory lesions present, no masses present, no atrophy present  · Vagina: normal vaginal vault without central or paravaginal defects, no discharge present, normal pH; no inflammatory lesions present, no masses present  · Bladder: non-tender to palpation  · Urethra: appears normal  · Cervix: normal   · Uterus: normal size, shape and consistency  · Adnexa: no adnexal tenderness present, no adnexal masses present  · Perineum: perineum within normal limits, no evidence of trauma, no rashes or skin lesions present  · Anus: anus within normal limits, no hemorrhoids present  · Inguinal Lymph Nodes: no lymphadenopathy present    Skin  · General Inspection: no rash, no lesions identified    Neurologic/Psychiatric  · Mental Status:  · Orientation: grossly oriented to person, place and time  · Mood and Affect: mood normal, affect appropriate    Assessment:    resolution of BV      Plan:   Offered and declined STD testing  Reviewed contraceptive options; to update w decision  Reassured that today's exam nl

## 2020-09-19 ENCOUNTER — HOSPITAL ENCOUNTER (EMERGENCY)
Age: 30
Discharge: HOME | End: 2020-09-20
Attending: EMERGENCY MEDICINE
Payer: MEDICAID

## 2020-09-19 DIAGNOSIS — N94.6 MODERATE CRAMPS WITH MENSES: ICD-10-CM

## 2020-09-19 DIAGNOSIS — Z86.59 H/O SCHIZOPHRENIA: ICD-10-CM

## 2020-09-19 DIAGNOSIS — R10.2 PELVIC PAIN: ICD-10-CM

## 2020-09-19 DIAGNOSIS — R10.84 ABDOMINAL PAIN, GENERALIZED: Primary | ICD-10-CM

## 2020-09-19 DIAGNOSIS — K59.00 CONSTIPATION, UNSPECIFIED CONSTIPATION TYPE: ICD-10-CM

## 2020-09-19 DIAGNOSIS — F12.90 MARIJUANA USE: ICD-10-CM

## 2020-09-19 LAB
ALBUMIN SERPL-MCNC: 3.7 G/DL (ref 3.4–5)
ALBUMIN/GLOB SERPL: 0.9 {RATIO} (ref 0.8–1.7)
ALP SERPL-CCNC: 53 U/L (ref 45–117)
ALT SERPL-CCNC: 17 U/L (ref 13–56)
ANION GAP SERPL CALC-SCNC: 7 MMOL/L (ref 3–18)
AST SERPL-CCNC: 21 U/L (ref 10–38)
BASOPHILS # BLD: 0 K/UL (ref 0–0.1)
BASOPHILS NFR BLD: 0 % (ref 0–2)
BILIRUB SERPL-MCNC: 0.2 MG/DL (ref 0.2–1)
BUN SERPL-MCNC: 15 MG/DL (ref 7–18)
BUN/CREAT SERPL: 19 (ref 12–20)
CALCIUM SERPL-MCNC: 9 MG/DL (ref 8.5–10.1)
CHLORIDE SERPL-SCNC: 103 MMOL/L (ref 100–111)
CO2 SERPL-SCNC: 28 MMOL/L (ref 21–32)
CREAT SERPL-MCNC: 0.81 MG/DL (ref 0.6–1.3)
DIFFERENTIAL METHOD BLD: ABNORMAL
EOSINOPHIL # BLD: 0.2 K/UL (ref 0–0.4)
EOSINOPHIL NFR BLD: 1 % (ref 0–5)
ERYTHROCYTE [DISTWIDTH] IN BLOOD BY AUTOMATED COUNT: 14.2 % (ref 11.6–14.5)
GLOBULIN SER CALC-MCNC: 4.3 G/DL (ref 2–4)
GLUCOSE SERPL-MCNC: 94 MG/DL (ref 74–99)
HCT VFR BLD AUTO: 36.2 % (ref 35–45)
HGB BLD-MCNC: 11.8 G/DL (ref 12–16)
LIPASE SERPL-CCNC: 183 U/L (ref 73–393)
LYMPHOCYTES # BLD: 3 K/UL (ref 0.9–3.6)
LYMPHOCYTES NFR BLD: 22 % (ref 21–52)
MCH RBC QN AUTO: 23.5 PG (ref 24–34)
MCHC RBC AUTO-ENTMCNC: 32.6 G/DL (ref 31–37)
MCV RBC AUTO: 72.1 FL (ref 74–97)
MONOCYTES # BLD: 0.8 K/UL (ref 0.05–1.2)
MONOCYTES NFR BLD: 6 % (ref 3–10)
NEUTS SEG # BLD: 9.4 K/UL (ref 1.8–8)
NEUTS SEG NFR BLD: 71 % (ref 40–73)
PLATELET # BLD AUTO: 355 K/UL (ref 135–420)
PMV BLD AUTO: 9.4 FL (ref 9.2–11.8)
POTASSIUM SERPL-SCNC: 4 MMOL/L (ref 3.5–5.5)
PROT SERPL-MCNC: 8 G/DL (ref 6.4–8.2)
RBC # BLD AUTO: 5.02 M/UL (ref 4.2–5.3)
SODIUM SERPL-SCNC: 138 MMOL/L (ref 136–145)
WBC # BLD AUTO: 13.3 K/UL (ref 4.6–13.2)

## 2020-09-19 PROCEDURE — 83690 ASSAY OF LIPASE: CPT

## 2020-09-19 PROCEDURE — 81001 URINALYSIS AUTO W/SCOPE: CPT

## 2020-09-19 PROCEDURE — 96374 THER/PROPH/DIAG INJ IV PUSH: CPT

## 2020-09-19 PROCEDURE — 85025 COMPLETE CBC W/AUTO DIFF WBC: CPT

## 2020-09-19 PROCEDURE — 99285 EMERGENCY DEPT VISIT HI MDM: CPT

## 2020-09-19 PROCEDURE — 80053 COMPREHEN METABOLIC PANEL: CPT

## 2020-09-19 PROCEDURE — 84702 CHORIONIC GONADOTROPIN TEST: CPT

## 2020-09-20 ENCOUNTER — APPOINTMENT (OUTPATIENT)
Dept: GENERAL RADIOLOGY | Age: 30
End: 2020-09-20
Attending: EMERGENCY MEDICINE
Payer: MEDICAID

## 2020-09-20 VITALS
SYSTOLIC BLOOD PRESSURE: 117 MMHG | BODY MASS INDEX: 29.99 KG/M2 | HEIGHT: 65 IN | RESPIRATION RATE: 12 BRPM | WEIGHT: 180 LBS | HEART RATE: 70 BPM | DIASTOLIC BLOOD PRESSURE: 72 MMHG | OXYGEN SATURATION: 100 % | TEMPERATURE: 98.3 F

## 2020-09-20 LAB
APPEARANCE UR: CLEAR
BACTERIA URNS QL MICRO: ABNORMAL /HPF
BILIRUB UR QL: NEGATIVE
COLOR UR: YELLOW
EPITH CASTS URNS QL MICRO: ABNORMAL /LPF (ref 0–5)
GLUCOSE UR STRIP.AUTO-MCNC: NEGATIVE MG/DL
HCG SERPL-ACNC: <1 MIU/ML (ref 0–10)
HGB UR QL STRIP: ABNORMAL
KETONES UR QL STRIP.AUTO: NEGATIVE MG/DL
LEUKOCYTE ESTERASE UR QL STRIP.AUTO: ABNORMAL
NITRITE UR QL STRIP.AUTO: NEGATIVE
PH UR STRIP: 5.5 [PH] (ref 5–8)
PROT UR STRIP-MCNC: NEGATIVE MG/DL
RBC #/AREA URNS HPF: ABNORMAL /HPF (ref 0–5)
SP GR UR REFRACTOMETRY: 1.02 (ref 1–1.03)
UROBILINOGEN UR QL STRIP.AUTO: 1 EU/DL (ref 0.2–1)
WBC URNS QL MICRO: ABNORMAL /HPF (ref 0–5)

## 2020-09-20 PROCEDURE — 74011250637 HC RX REV CODE- 250/637: Performed by: EMERGENCY MEDICINE

## 2020-09-20 PROCEDURE — 74011250636 HC RX REV CODE- 250/636: Performed by: EMERGENCY MEDICINE

## 2020-09-20 PROCEDURE — 74019 RADEX ABDOMEN 2 VIEWS: CPT

## 2020-09-20 RX ORDER — POLYETHYLENE GLYCOL 3350 17 G/17G
17 POWDER, FOR SOLUTION ORAL
Qty: 507 G | Refills: 0 | Status: SHIPPED | OUTPATIENT
Start: 2020-09-20 | End: 2020-09-20

## 2020-09-20 RX ORDER — DICYCLOMINE HYDROCHLORIDE 10 MG/1
10 CAPSULE ORAL 4 TIMES DAILY
Qty: 20 CAP | Refills: 0 | Status: SHIPPED | OUTPATIENT
Start: 2020-09-20 | End: 2020-09-20

## 2020-09-20 RX ORDER — KETOROLAC TROMETHAMINE 30 MG/ML
30 INJECTION, SOLUTION INTRAMUSCULAR; INTRAVENOUS
Status: COMPLETED | OUTPATIENT
Start: 2020-09-20 | End: 2020-09-20

## 2020-09-20 RX ORDER — DICYCLOMINE HYDROCHLORIDE 10 MG/1
20 CAPSULE ORAL
Status: COMPLETED | OUTPATIENT
Start: 2020-09-20 | End: 2020-09-20

## 2020-09-20 RX ORDER — DICYCLOMINE HYDROCHLORIDE 10 MG/1
10 CAPSULE ORAL 4 TIMES DAILY
Qty: 20 CAP | Refills: 0 | Status: SHIPPED | OUTPATIENT
Start: 2020-09-20 | End: 2020-09-25

## 2020-09-20 RX ADMIN — DICYCLOMINE HYDROCHLORIDE 20 MG: 10 CAPSULE ORAL at 02:10

## 2020-09-20 RX ADMIN — KETOROLAC TROMETHAMINE 30 MG: 30 INJECTION, SOLUTION INTRAMUSCULAR; INTRAVENOUS at 02:10

## 2020-09-20 NOTE — DISCHARGE INSTRUCTIONS
Patient Education        Abdominal Pain: Care Instructions  Your Care Instructions     Abdominal pain has many possible causes. Some aren't serious and get better on their own in a few days. Others need more testing and treatment. If your pain continues or gets worse, you need to be rechecked and may need more tests to find out what is wrong. You may need surgery to correct the problem. Don't ignore new symptoms, such as fever, nausea and vomiting, urination problems, pain that gets worse, and dizziness. These may be signs of a more serious problem. Your doctor may have recommended a follow-up visit in the next 8 to 12 hours. If you are not getting better, you may need more tests or treatment. The doctor has checked you carefully, but problems can develop later. If you notice any problems or new symptoms, get medical treatment right away. Follow-up care is a key part of your treatment and safety. Be sure to make and go to all appointments, and call your doctor if you are having problems. It's also a good idea to know your test results and keep a list of the medicines you take. How can you care for yourself at home? · Rest until you feel better. · To prevent dehydration, drink plenty of fluids, enough so that your urine is light yellow or clear like water. Choose water and other caffeine-free clear liquids until you feel better. If you have kidney, heart, or liver disease and have to limit fluids, talk with your doctor before you increase the amount of fluids you drink. · If your stomach is upset, eat mild foods, such as rice, dry toast or crackers, bananas, and applesauce. Try eating several small meals instead of two or three large ones. · Wait until 48 hours after all symptoms have gone away before you have spicy foods, alcohol, and drinks that contain caffeine. · Do not eat foods that are high in fat. · Avoid anti-inflammatory medicines such as aspirin, ibuprofen (Advil, Motrin), and naproxen (Aleve). These can cause stomach upset. Talk to your doctor if you take daily aspirin for another health problem. When should you call for help? Call 911 anytime you think you may need emergency care. For example, call if:    · You passed out (lost consciousness).     · You pass maroon or very bloody stools.     · You vomit blood or what looks like coffee grounds.     · You have new, severe belly pain. Call your doctor now or seek immediate medical care if:    · Your pain gets worse, especially if it becomes focused in one area of your belly.     · You have a new or higher fever.     · Your stools are black and look like tar, or they have streaks of blood.     · You have unexpected vaginal bleeding.     · You have symptoms of a urinary tract infection. These may include:  ? Pain when you urinate. ? Urinating more often than usual.  ? Blood in your urine.     · You are dizzy or lightheaded, or you feel like you may faint. Watch closely for changes in your health, and be sure to contact your doctor if:    · You are not getting better after 1 day (24 hours). Where can you learn more? Go to http://holgerFreevermanuelito.info/  Enter H508 in the search box to learn more about \"Abdominal Pain: Care Instructions. \"  Current as of: June 26, 2019               Content Version: 12.6  © 0414-8113 [employer]. Care instructions adapted under license by [employer] (which disclaims liability or warranty for this information). If you have questions about a medical condition or this instruction, always ask your healthcare professional. [place] [address] any warranty or liability for your use of this information. Patient Education        Constipation: Care Instructions  Your Care Instructions     Constipation means that you have a hard time passing stools (bowel movements). People pass stools from 3 times a day to once every 3 days.  What is normal for you may be different. Constipation may occur with pain in the rectum and cramping. The pain may get worse when you try to pass stools. Sometimes there are small amounts of bright red blood on toilet paper or the surface of stools. This is because of enlarged veins near the rectum (hemorrhoids). A few changes in your diet and lifestyle may help you avoid ongoing constipation. Your doctor may also prescribe medicine to help loosen your stool. Some medicines can cause constipation. These include pain medicines and antidepressants. Tell your doctor about all the medicines you take. Your doctor may want to make a medicine change to ease your symptoms. Follow-up care is a key part of your treatment and safety. Be sure to make and go to all appointments, and call your doctor if you are having problems. It's also a good idea to know your test results and keep a list of the medicines you take. How can you care for yourself at home? · Drink plenty of fluids, enough so that your urine is light yellow or clear like water. If you have kidney, heart, or liver disease and have to limit fluids, talk with your doctor before you increase the amount of fluids you drink. · Include high-fiber foods in your diet each day. These include fruits, vegetables, beans, and whole grains. · Get at least 30 minutes of exercise on most days of the week. Walking is a good choice. You also may want to do other activities, such as running, swimming, cycling, or playing tennis or team sports. · Take a fiber supplement, such as Citrucel or Metamucil, every day. Read and follow all instructions on the label. · Schedule time each day for a bowel movement. A daily routine may help. Take your time having your bowel movement. · Support your feet with a small step stool when you sit on the toilet. This helps flex your hips and places your pelvis in a squatting position. · Your doctor may recommend an over-the-counter laxative to relieve your constipation. Examples are Milk of Magnesia and MiraLax. Read and follow all instructions on the label. Do not use laxatives on a long-term basis. When should you call for help? Call your doctor now or seek immediate medical care if:    · You have new or worse belly pain.     · You have new or worse nausea or vomiting.     · You have blood in your stools. Watch closely for changes in your health, and be sure to contact your doctor if:    · Your constipation is getting worse.     · You do not get better as expected. Where can you learn more? Go to http://holger-manuelito.info/  Enter P343 in the search box to learn more about \"Constipation: Care Instructions. \"  Current as of: June 26, 2019               Content Version: 12.6  © 2453-0700 [employer]. Care instructions adapted under license by [employer] (which disclaims liability or warranty for this information). If you have questions about a medical condition or this instruction, always ask your healthcare professional. [place] [address] any warranty or liability for your use of this information. Patient Education        Painful Menstrual Cramps: Care Instructions  Your Care Instructions     Painful menstrual cramps are very common. Many women go to the doctor because of bad cramps when they get their period. You may have cramps in your back, thighs, and belly. You may also have diarrhea, constipation, or nausea. Some women also get dizzy. Pain medicine and home treatment can help you feel better. Follow-up care is a key part of your treatment and safety. Be sure to make and go to all appointments, and call your doctor if you are having problems. It's also a good idea to know your test results and keep a list of the medicines you take. How can you care for yourself at home? · Take anti-inflammatory medicines for pain.  Ibuprofen (Advil, Motrin) and naproxen (Aleve) usually work better than aspirin. ? Be safe with medicines. Talk to your doctor or pharmacist before you take any of these medicines. They may not be safe if you take other medicines or have other health problems. ? Start taking the recommended dose of pain medicine as soon as you start to feel pain. Or you can start on the day before your period. Keep taking the medicine for as many days as you have cramps. ? If anti-inflammatory medicines don't help, try acetaminophen (Tylenol). ? Do not take two or more pain medicines at the same time unless the doctor told you to. Many pain medicines have acetaminophen, which is Tylenol. Too much acetaminophen (Tylenol) can be harmful. ? Read and follow all instructions on the label. · Put a heating pad set on low or a hot water bottle on your belly. Or take a warm bath. Heat improves blood flow and may help with pain. · Lie down and put a pillow under your knees. Or lie on your side and bring your knees up to your chest. This will help with any back pressure. · Get at least 30 minutes of exercise on most days of the week. This improves blood flow and may decrease pain. Walking is a good choice. You also may want to do other activities, such as running, swimming, cycling, or playing tennis or team sports. When should you call for help? Call your doctor now or seek immediate medical care if:    · You have new or worse belly or pelvic pain.     · You have severe vaginal bleeding. Watch closely for changes in your health, and be sure to contact your doctor if:    · You have unusual vaginal bleeding.     · You do not get better as expected. Where can you learn more? Go to http://holger-manuelito.info/  Enter Z243 in the search box to learn more about \"Painful Menstrual Cramps: Care Instructions. \"  Current as of: November 8, 2019               Content Version: 12.6  © 3698-7466 [employer].    Care instructions adapted under license by [employer] (which disclaims liability or warranty for this information). If you have questions about a medical condition or this instruction, always ask your healthcare professional. [address] any warranty or liability for your use of this information.

## 2020-09-20 NOTE — ED TRIAGE NOTES
Pt arrives c/o severe mid abd pain that began x1 week pta. Pt states [facility] and told it was just menstrual cramps, pt is on her period but states pain began prior to that.

## 2020-09-24 NOTE — ED PROVIDER NOTES
EMERGENCY DEPARTMENT HISTORY AND PHYSICAL EXAM    Date: 9/19/2020  Patient Name: [facility]    History of Presenting Illness     Chief Complaint   Patient presents with    Abdominal Pain         History Provided By: Patient    Additional History (Context):   4:01 AM  [facility] is a 35 y.o. female with PMHX of hypertension, schizoaffective disorder, social anxiety disorder, iron deficiency anemia, depression who presents to the emergency department C/O crampy abdominal pain. She is already been seen through her primary care doctor and OB for regular menstrual cycles. She states she has been to some physicians and discussed the symptoms performed for that which is her menstrual cycles. Mostly they happen during her menses but at times they do not seem to be associated with her cycles. She takes no specific medications for her symptoms but occasional NSAIDs. She states that her cycles are regular believes she had a menstrual cycle about a week ago. She no longer having any vaginal bleeding. She denies any nausea vomiting diarrhea fevers chills or dysuria. Social History  She occasionally smokes marijuana but denies alcohol or cigarette use    Family History  Denies family history of obstetric care: Problems. PCP: None    Current Outpatient Medications   Medication Sig Dispense Refill    dicyclomine (BentyL) 10 mg capsule Take 1 Cap by mouth four (4) times daily for 5 days.  20 Cap 0    ABILIFY MAINTENA 400 mg injection       prazosin (MINIPRESS) 1 mg capsule       ARIPiprazole (ABILIFY) 10 mg tablet aripiprazole 10 mg tablet   TK 1 T PO IN THE MORNING      amLODIPine (NORVASC) 5 mg tablet TK 1 T PO QD  5    benztropine (COGENTIN) 0.5 mg tablet TK 1 T PO BID  2    busPIRone (BUSPAR) 10 mg tablet TK 1 T PO BID  2    ferrous sulfate 325 mg (65 mg iron) tablet TK 1 T PO 1 TIME A DAY  5    haloperidol (HALDOL) 5 mg tablet TK 1 T PO BID  0    hydroCHLOROthiazide (MICROZIDE) 12.5 mg capsule TK 1 C PO QD  5    sertraline (ZOLOFT) 100 mg tablet TK 1 T PO QD  2       Past History     Past Medical History:  Past Medical History:   Diagnosis Date    Depression     Hypertension     Iron deficiency anemia     Schizoaffective disorder, bipolar type ([address]     Self-mutilation     Social anxiety disorder        Past Surgical History:  Past Surgical History:   Procedure Laterality Date    HX OTHER SURGICAL      right shoulder surgery. Family History:  History reviewed. No pertinent family history. Social History:  Social History     Tobacco Use    Smoking status: Never Smoker    Smokeless tobacco: Never Used   Substance Use Topics    Alcohol use: No    Drug use: Yes     Frequency: 1.0 times per week     Types: Marijuana       Allergies:  No Known Allergies      Review of Systems   Review of Systems   Constitutional: Negative for chills, fever and unexpected weight change. Gastrointestinal: Positive for abdominal pain. Genitourinary: Positive for vaginal bleeding. Psychiatric/Behavioral: The patient is nervous/anxious. All other systems reviewed and are negative. Physical Exam     Vitals:    09/20/20 0215 09/20/20 0245 09/20/20 0300 09/20/20 0357   BP: 127/80 130/87 127/89 117/72   Pulse:    70   Resp:    12   Temp:       SpO2: 100% 100% 100% 100%   Weight:       Height:         Physical Exam  Vitals signs and nursing note reviewed. Constitutional:       General: She is not in acute distress. Appearance: She is well-developed. She is not diaphoretic. HENT:      Head: Normocephalic and atraumatic. Mouth/Throat:      Pharynx: No oropharyngeal exudate. Eyes:      General: No scleral icterus. Conjunctiva/sclera: Conjunctivae normal.      Pupils: Pupils are equal, round, and reactive to light. Comments: No pallor   Neck:      Musculoskeletal: Normal range of motion and neck supple. Thyroid: No thyromegaly. Vascular: No JVD.       Trachea: No tracheal deviation. Cardiovascular:      Rate and Rhythm: Normal rate and regular rhythm. Heart sounds: Normal heart sounds. Pulmonary:      Effort: Pulmonary effort is normal. No respiratory distress. Breath sounds: Normal breath sounds. No stridor. Abdominal:      General: Bowel sounds are normal. There is no distension. Palpations: Abdomen is soft. Tenderness: There is no abdominal tenderness. There is no guarding or rebound. Musculoskeletal: Normal range of motion. General: No tenderness. Comments: No soft tissue injuries   Lymphadenopathy:      Cervical: No cervical adenopathy. Skin:     General: Skin is warm and dry. Findings: No erythema or rash. Neurological:      Mental Status: She is alert and oriented to person, place, and time. GCS: GCS eye subscore is 4. GCS verbal subscore is 5. GCS motor subscore is 6. Cranial Nerves: No cranial nerve deficit. Coordination: Coordination normal.   Psychiatric:         Behavior: Behavior normal.         Thought Content: Thought content normal.         Judgment: Judgment normal.         Diagnostic Study Results     Labs -   No results found for this or any previous visit (from the past 12 hour(s)). Radiologic Studies -   XR ABD FLAT/ ERECT   Final Result   IMPRESSION:      1. Moderate colonic stool burden with no bowel obstruction identified. CT Results  (Last 48 hours)    None        CXR Results  (Last 48 hours)    None          Medications given in the ED-  Medications   ketorolac (TORADOL) injection 30 mg (30 mg IntraVENous Given 9/20/20 0210)   dicyclomine (BENTYL) capsule 20 mg (20 mg Oral Given 9/20/20 0210)         Medical Decision Making   I am the first provider for this patient. I reviewed the vital signs, available nursing notes, past medical history, past surgical history, family history and social history. Vital Signs-Reviewed the patient's vital signs.     Pulse Oximetry Analysis -100% on room air    Records Reviewed: NURSING NOTES AND PREVIOUS MEDICAL RECORDS    Provider Notes (Medical Decision Making): She has little to no tenderness to examination right now. Screening x-ray showed no evidence of stones or obstructive process chest x-ray shows referring symptoms from her chest including ammonia effusion there is no evidence of pancreatitis kidney stone or infection or other problems she made improvement on her symptoms after giving her GI's medications as well as treatments. We also recommended she eliminate any kind of stool volume using MiraLAX. Outpatient follow-up recommended. Procedures:  Procedures    ED Course:   6:22 PM: Initial assessment performed. The patients presenting problems have been discussed, and they are in agreement with the care plan formulated and outlined with them. I have encouraged them to ask questions as they arise throughout their visit. Diagnosis and Disposition       DISCHARGE NOTE:  7:38 AM  Norberto Matute's  results have been reviewed with her. She has been counseled regarding her diagnosis, treatment, and plan. She verbally conveys understanding and agreement of the signs, symptoms, diagnosis, treatment and prognosis and additionally agrees to follow up as discussed. She also agrees with the care-plan and conveys that all of her questions have been answered. I have also provided discharge instructions for her that include: educational information regarding their diagnosis and treatment, and list of reasons why they would want to return to the ED prior to their follow-up appointment, should her condition change. She has been provided with education for proper emergency department utilization. CLINICAL IMPRESSION:    1. Abdominal pain, generalized    2. Pelvic pain    3. Moderate cramps with menses    4. Constipation, unspecified constipation type    5. H/O schizophrenia    6. Marijuana use        PLAN:  1. D/C Home  2.    Discharge Medication List as of 9/20/2020  3:40 AM      START taking these medications    Details   polyethylene glycol (Miralax) 17 gram/dose powder Take 17 g by mouth now for 1 dose. 1 tablespoon with 8 oz of water daily, Normal, Disp-507 g,R-0      dicyclomine (BentyL) 10 mg capsule Take 1 Cap by mouth four (4) times daily for 5 days. , Normal, Disp-20 Cap,R-0         CONTINUE these medications which have NOT CHANGED    Details   ABILIFY MAINTENA 400 mg injection Historical Med, MAGGIE      prazosin (MINIPRESS) 1 mg capsule Historical Med      ARIPiprazole (ABILIFY) 10 mg tablet aripiprazole 10 mg tablet   TK 1 T PO IN THE MORNING, Historical Med      amLODIPine (NORVASC) 5 mg tablet TK 1 T PO QD, Historical Med, R-5      benztropine (COGENTIN) 0.5 mg tablet TK 1 T PO BID, Historical Med, R-2      busPIRone (BUSPAR) 10 mg tablet TK 1 T PO BID, Historical Med, R-2      ferrous sulfate 325 mg (65 mg iron) tablet TK 1 T PO 1 TIME A DAY, Historical Med, R-5      haloperidol (HALDOL) 5 mg tablet TK 1 T PO BID, Historical Med, R-0      hydroCHLOROthiazide (MICROZIDE) 12.5 mg capsule TK 1 C PO QD, Historical Med, R-5      sertraline (ZOLOFT) 100 mg tablet TK 1 T PO QD, Historical Med, R-2           3. Follow-up Information     Follow up With Specialties Details Why Contact Info    Brii Lawrence NP Nurse Practitioner In 1 week  [address] [place] [id number] [phone]      [facility] EMERGENCY DEPT Emergency Medicine  As needed [address] [facility] [id number]  [phone]        _______________________________    This note was partially transcribed via voice recognition software. Although efforts have been made to catch any discrepancies, it may contain sound alike words, grammatical errors, or nonsensical words.

## 2020-12-13 ENCOUNTER — HOSPITAL ENCOUNTER (EMERGENCY)
Age: 30
Discharge: PSYCHIATRIC OR SUBSTANCE USE FACILITY | End: 2020-12-14
Attending: EMERGENCY MEDICINE
Payer: MEDICAID

## 2020-12-13 DIAGNOSIS — T50.902A INTENTIONAL DRUG OVERDOSE, INITIAL ENCOUNTER (HCC): Primary | ICD-10-CM

## 2020-12-13 LAB
BASOPHILS # BLD: 0 K/UL (ref 0–0.1)
BASOPHILS NFR BLD: 0 % (ref 0–2)
DIFFERENTIAL METHOD BLD: ABNORMAL
EOSINOPHIL # BLD: 0.1 K/UL (ref 0–0.4)
EOSINOPHIL NFR BLD: 0 % (ref 0–5)
ERYTHROCYTE [DISTWIDTH] IN BLOOD BY AUTOMATED COUNT: 14.5 % (ref 11.6–14.5)
HCG SERPL QL: NEGATIVE
HCT VFR BLD AUTO: 36.3 % (ref 35–45)
HGB BLD-MCNC: 12.1 G/DL (ref 12–16)
LYMPHOCYTES # BLD: 1.9 K/UL (ref 0.9–3.6)
LYMPHOCYTES NFR BLD: 11 % (ref 21–52)
MCH RBC QN AUTO: 23.2 PG (ref 24–34)
MCHC RBC AUTO-ENTMCNC: 33.3 G/DL (ref 31–37)
MCV RBC AUTO: 69.5 FL (ref 74–97)
MONOCYTES # BLD: 0.4 K/UL (ref 0.05–1.2)
MONOCYTES NFR BLD: 3 % (ref 3–10)
NEUTS SEG # BLD: 14.9 K/UL (ref 1.8–8)
NEUTS SEG NFR BLD: 86 % (ref 40–73)
PLATELET # BLD AUTO: 301 K/UL (ref 135–420)
PMV BLD AUTO: 10 FL (ref 9.2–11.8)
RBC # BLD AUTO: 5.22 M/UL (ref 4.2–5.3)
WBC # BLD AUTO: 17.4 K/UL (ref 4.6–13.2)

## 2020-12-13 PROCEDURE — 99285 EMERGENCY DEPT VISIT HI MDM: CPT

## 2020-12-13 PROCEDURE — 85025 COMPLETE CBC W/AUTO DIFF WBC: CPT

## 2020-12-13 PROCEDURE — 84703 CHORIONIC GONADOTROPIN ASSAY: CPT

## 2020-12-13 PROCEDURE — 93005 ELECTROCARDIOGRAM TRACING: CPT

## 2020-12-13 PROCEDURE — 80307 DRUG TEST PRSMV CHEM ANLYZR: CPT

## 2020-12-13 PROCEDURE — 80053 COMPREHEN METABOLIC PANEL: CPT

## 2020-12-14 ENCOUNTER — HOSPITAL ENCOUNTER (INPATIENT)
Age: 30
LOS: 4 days | Discharge: HOME | DRG: 751 | End: 2020-12-18
Attending: PSYCHIATRY & NEUROLOGY | Admitting: PSYCHIATRY & NEUROLOGY
Payer: MEDICAID

## 2020-12-14 VITALS
HEIGHT: 65 IN | OXYGEN SATURATION: 100 % | TEMPERATURE: 98.3 F | RESPIRATION RATE: 16 BRPM | DIASTOLIC BLOOD PRESSURE: 87 MMHG | HEART RATE: 63 BPM | WEIGHT: 200 LBS | SYSTOLIC BLOOD PRESSURE: 136 MMHG | BODY MASS INDEX: 33.32 KG/M2

## 2020-12-14 LAB
ALBUMIN SERPL-MCNC: 3.8 G/DL (ref 3.4–5)
ALBUMIN/GLOB SERPL: 1 {RATIO} (ref 0.8–1.7)
ALP SERPL-CCNC: 56 U/L (ref 45–117)
ALT SERPL-CCNC: 18 U/L (ref 13–56)
AMPHET UR QL SCN: NEGATIVE
ANION GAP SERPL CALC-SCNC: 10 MMOL/L (ref 3–18)
APAP SERPL-MCNC: <2 UG/ML (ref 10–30)
APPEARANCE UR: CLEAR
AST SERPL-CCNC: 23 U/L (ref 10–38)
BACTERIA URNS QL MICRO: ABNORMAL /HPF
BARBITURATES UR QL SCN: NEGATIVE
BENZODIAZ UR QL: NEGATIVE
BILIRUB SERPL-MCNC: <0.1 MG/DL (ref 0.2–1)
BILIRUB UR QL: NEGATIVE
BUN SERPL-MCNC: 15 MG/DL (ref 7–18)
BUN/CREAT SERPL: 18 (ref 12–20)
CALCIUM SERPL-MCNC: 8.8 MG/DL (ref 8.5–10.1)
CANNABINOIDS UR QL SCN: NEGATIVE
CHLORIDE SERPL-SCNC: 106 MMOL/L (ref 100–111)
CO2 SERPL-SCNC: 22 MMOL/L (ref 21–32)
COCAINE UR QL SCN: NEGATIVE
COLOR UR: YELLOW
CREAT SERPL-MCNC: 0.84 MG/DL (ref 0.6–1.3)
EPITH CASTS URNS QL MICRO: ABNORMAL /LPF (ref 0–5)
ETHANOL SERPL-MCNC: 75 MG/DL (ref 0–3)
GLOBULIN SER CALC-MCNC: 4 G/DL (ref 2–4)
GLUCOSE SERPL-MCNC: 106 MG/DL (ref 74–99)
GLUCOSE UR STRIP.AUTO-MCNC: NEGATIVE MG/DL
HDSCOM,HDSCOM: NORMAL
HGB UR QL STRIP: ABNORMAL
KETONES UR QL STRIP.AUTO: NEGATIVE MG/DL
LEUKOCYTE ESTERASE UR QL STRIP.AUTO: NEGATIVE
METHADONE UR QL: NEGATIVE
NITRITE UR QL STRIP.AUTO: NEGATIVE
OPIATES UR QL: NEGATIVE
PCP UR QL: NEGATIVE
PH UR STRIP: 5 [PH] (ref 5–8)
POTASSIUM SERPL-SCNC: 3.4 MMOL/L (ref 3.5–5.5)
PROT SERPL-MCNC: 7.8 G/DL (ref 6.4–8.2)
PROT UR STRIP-MCNC: NEGATIVE MG/DL
RBC #/AREA URNS HPF: ABNORMAL /HPF (ref 0–5)
SALICYLATES SERPL-MCNC: <1.7 MG/DL (ref 2.8–20)
SODIUM SERPL-SCNC: 138 MMOL/L (ref 136–145)
SP GR UR REFRACTOMETRY: 1.01 (ref 1–1.03)
UROBILINOGEN UR QL STRIP.AUTO: 0.2 EU/DL (ref 0.2–1)
WBC URNS QL MICRO: ABNORMAL /HPF (ref 0–5)

## 2020-12-14 PROCEDURE — 65220000003 HC RM SEMIPRIVATE PSYCH

## 2020-12-14 PROCEDURE — 81001 URINALYSIS AUTO W/SCOPE: CPT

## 2020-12-14 PROCEDURE — 74011250637 HC RX REV CODE- 250/637: Performed by: PSYCHIATRY & NEUROLOGY

## 2020-12-14 PROCEDURE — 80307 DRUG TEST PRSMV CHEM ANLYZR: CPT

## 2020-12-14 PROCEDURE — 87635 SARS-COV-2 COVID-19 AMP PRB: CPT

## 2020-12-14 RX ORDER — PRAZOSIN HYDROCHLORIDE 1 MG/1
1 CAPSULE ORAL
Status: DISCONTINUED | OUTPATIENT
Start: 2020-12-14 | End: 2020-12-18 | Stop reason: HOSPADM

## 2020-12-14 RX ORDER — ARIPIPRAZOLE 10 MG/1
10 TABLET ORAL DAILY
Status: DISCONTINUED | OUTPATIENT
Start: 2020-12-15 | End: 2020-12-15

## 2020-12-14 RX ORDER — TRAZODONE HYDROCHLORIDE 50 MG/1
50 TABLET ORAL
Status: DISCONTINUED | OUTPATIENT
Start: 2020-12-14 | End: 2020-12-18 | Stop reason: HOSPADM

## 2020-12-14 RX ORDER — IBUPROFEN 600 MG/1
600 TABLET ORAL
Status: DISCONTINUED | OUTPATIENT
Start: 2020-12-14 | End: 2020-12-18 | Stop reason: HOSPADM

## 2020-12-14 RX ORDER — BENZTROPINE MESYLATE 1 MG/ML
1 INJECTION INTRAMUSCULAR; INTRAVENOUS
Status: DISCONTINUED | OUTPATIENT
Start: 2020-12-14 | End: 2020-12-18 | Stop reason: HOSPADM

## 2020-12-14 RX ORDER — HALOPERIDOL 5 MG/1
5 TABLET ORAL
Status: DISCONTINUED | OUTPATIENT
Start: 2020-12-14 | End: 2020-12-18 | Stop reason: HOSPADM

## 2020-12-14 RX ORDER — AMLODIPINE BESYLATE 5 MG/1
5 TABLET ORAL DAILY
Status: DISCONTINUED | OUTPATIENT
Start: 2020-12-15 | End: 2020-12-18 | Stop reason: HOSPADM

## 2020-12-14 RX ORDER — BENZTROPINE MESYLATE 1 MG/1
1 TABLET ORAL
Status: DISCONTINUED | OUTPATIENT
Start: 2020-12-14 | End: 2020-12-18 | Stop reason: HOSPADM

## 2020-12-14 RX ORDER — HYDROXYZINE PAMOATE 50 MG/1
50 CAPSULE ORAL
Status: DISCONTINUED | OUTPATIENT
Start: 2020-12-14 | End: 2020-12-18 | Stop reason: HOSPADM

## 2020-12-14 RX ORDER — SERTRALINE HYDROCHLORIDE 50 MG/1
50 TABLET, FILM COATED ORAL DAILY
Status: DISCONTINUED | OUTPATIENT
Start: 2020-12-15 | End: 2020-12-15

## 2020-12-14 RX ORDER — BUSPIRONE HYDROCHLORIDE 10 MG/1
10 TABLET ORAL 2 TIMES DAILY
Status: DISCONTINUED | OUTPATIENT
Start: 2020-12-14 | End: 2020-12-15

## 2020-12-14 RX ORDER — HALOPERIDOL 5 MG/ML
5 INJECTION INTRAMUSCULAR
Status: DISCONTINUED | OUTPATIENT
Start: 2020-12-14 | End: 2020-12-18 | Stop reason: HOSPADM

## 2020-12-14 RX ADMIN — BUSPIRONE HYDROCHLORIDE 10 MG: 10 TABLET ORAL at 20:54

## 2020-12-14 RX ADMIN — PRAZOSIN HYDROCHLORIDE 1 MG: 1 CAPSULE ORAL at 20:54

## 2020-12-14 NOTE — DISCHARGE INSTRUCTIONS
Patient Education        Alcohol, Drug, or Poison Ingestion: Care Instructions  Your Care Instructions     A person can become very sick, or die, from swallowing or using alcohol, drugs, or poisons. Alcohol poisoning occurs when a person drinks a large amount of alcohol. Alcohol can stop nerve signals that control breathing. It can also stop the gag reflex that prevents choking. Alcohol poisoning is serious. It can lead to brain damage or death if it's not treated right away. Drugs can be used by accident or on purpose. They can be swallowed, inhaled, injected, or absorbed through the skin. Drugs include over-the-counter medicine (such as aspirin or acetaminophen) and prescription medicine. They also include vitamins and supplements. And they include illegal drugs such as cocaine and heroin. And poisons are all around us. They include household , cosmetics, houseplants, and garden chemicals. The doctor has checked you carefully, but problems can develop later. If you notice any problems or new symptoms, get medical treatment right away. Follow-up care is a key part of your treatment and safety. Be sure to make and go to all appointments, and call your doctor if you are having problems. It's also a good idea to know your test results and keep a list of the medicines you take. How can you care for yourself at home? Alcohol problems  · Talk to your doctor or counselor about programs that can help you stop using alcohol. · Plan ways to avoid being tempted to drink. ? Get rid of all alcohol in your home. ? Avoid places where you tend to drink. ? Stay away from places or events that offer alcohol. ? Stay away from people who drink a lot. Drug problems  · Talk to your doctor about programs that can help you stop using drugs. · Get rid of any drugs you might be tempted to misuse. · Learn how to say no when other people use drugs. · Don't spend time with people who use drugs.   Poison prevention  · Keep products in the containers they came in. Keep them with the original labels. · Be careful when you use cleaning products, paints, solvents, and pesticides. Read labels before use. Use a fan to move strong odors and fumes out of your home. · Do not mix cleaning products. Try to use nontoxic . These include vinegar, lemon juice, and baking soda. When should you call for help? Poison control centers, hospitals, or your doctor can give immediate advice in the case of a poisoning. The [employer] number is [phone]. Have the poison container with you so you can give complete information to the poison control center, such as what the poison or substance is, how much was taken and when. Do not try to make the person vomit. Call 911 anytime you think you may need emergency care. For example, call if you or someone else:    · Has used or currently uses alcohol or drugs and is very confused or can't stay awake.     · Has passed out (lost consciousness).     · Has severe trouble breathing.     · Is having a seizure. Call your doctor now or seek immediate medical care if you or someone else:    · Has new symptoms, or is not acting normally. Watch closely for changes in your health, and be sure to contact your doctor if:    · You do not get better as expected.     · You need help with drug or alcohol problems.     · You have problems with depression or other mental health issues. Where can you learn more? Go to http://www.gray.com/  Enter A385 in the search box to learn more about \"Alcohol, Drug, or Poison Ingestion: Care Instructions. \"  Current as of: June 26, 2019               Content Version: 12.6  © 1166-3819 [employer]. Care instructions adapted under license by [employer] (which disclaims liability or warranty for this information).  If you have questions about a medical condition or this instruction, always ask your healthcare professional. [place] [address] any warranty or liability for your use of this information.

## 2020-12-14 NOTE — ED TRIAGE NOTES
Called maricruz fabian to respond to nurse request to answer questions regarding medications.  Nurse unavailable at this time

## 2020-12-14 NOTE — PROGRESS NOTES
1230 update: notified by admissions for [facility] that they will have a bed for patient this afternoon and they will call ED directly to coordinate transfer.  contacted by ED for voluntary inpt psych placement. If at any time Patient becomes involuntary- ED will need to contact Police for a paperless ECO (Emergency Custody Order) who will then call [facility] directly to assist with TDO as needed.  will contact all facilities and they will contact ED directly for questions or acceptances. CM does not need to be notified by staff once bed confirmed to ED by facility. Once all facilities have been contacted should a bed not be available through today. CM will resume search in the morning and continue to work toward transition of care.           CM contacted and provided MRN  to [facility] - they are at capacity; information provided    CM contacted and provided MRN  To [facility], and [facility] for review- will review and will contact CM; if no bed will continue search

## 2020-12-14 NOTE — ED NOTES
Poison controol called and stated monitor 8hrs from time of ingestion - until 0630. Symptomatic and supportive care. Benzos for any rigidity. Monitor for tachycardia.
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
## 2020-12-15 PROBLEM — F43.10 PTSD (POST-TRAUMATIC STRESS DISORDER): Status: ACTIVE | Noted: 2020-12-15

## 2020-12-15 PROBLEM — F33.2 SEVERE EPISODE OF RECURRENT MAJOR DEPRESSIVE DISORDER, WITHOUT PSYCHOTIC FEATURES (HCC): Status: ACTIVE | Noted: 2020-12-15

## 2020-12-15 PROBLEM — F33.9 RECURRENT DEPRESSION (HCC): Status: RESOLVED | Noted: 2018-01-16 | Resolved: 2020-12-15

## 2020-12-15 PROBLEM — E66.01 SEVERE OBESITY (BMI 35.0-39.9): Status: RESOLVED | Noted: 2018-06-21 | Resolved: 2020-12-15

## 2020-12-15 LAB
ATRIAL RATE: 82 BPM
CALCULATED P AXIS, ECG09: 34 DEGREES
CALCULATED R AXIS, ECG10: 51 DEGREES
CALCULATED T AXIS, ECG11: 46 DEGREES
COVID-19 RAPID TEST, COVR: NOT DETECTED
DIAGNOSIS, 93000: NORMAL
P-R INTERVAL, ECG05: 170 MS
Q-T INTERVAL, ECG07: 376 MS
QRS DURATION, ECG06: 90 MS
QTC CALCULATION (BEZET), ECG08: 439 MS
SARS-COV-2, COV2NT: NOT DETECTED
SOURCE, COVRS: NORMAL
SPECIMEN TYPE, XMCV1T: NORMAL
VENTRICULAR RATE, ECG03: 82 BPM

## 2020-12-15 PROCEDURE — 65220000003 HC RM SEMIPRIVATE PSYCH

## 2020-12-15 PROCEDURE — 74011250637 HC RX REV CODE- 250/637: Performed by: PSYCHIATRY & NEUROLOGY

## 2020-12-15 PROCEDURE — 99223 1ST HOSP IP/OBS HIGH 75: CPT | Performed by: PSYCHIATRY & NEUROLOGY

## 2020-12-15 RX ORDER — VENLAFAXINE HYDROCHLORIDE 75 MG/1
75 CAPSULE, EXTENDED RELEASE ORAL DAILY
Status: DISCONTINUED | OUTPATIENT
Start: 2020-12-15 | End: 2020-12-18 | Stop reason: HOSPADM

## 2020-12-15 RX ADMIN — BUSPIRONE HYDROCHLORIDE 10 MG: 10 TABLET ORAL at 08:40

## 2020-12-15 RX ADMIN — AMLODIPINE BESYLATE 5 MG: 5 TABLET ORAL at 08:39

## 2020-12-15 RX ADMIN — PRAZOSIN HYDROCHLORIDE 1 MG: 1 CAPSULE ORAL at 20:22

## 2020-12-15 RX ADMIN — VENLAFAXINE HYDROCHLORIDE 75 MG: 75 CAPSULE, EXTENDED RELEASE ORAL at 12:33

## 2020-12-15 NOTE — BH NOTES
Patient being admitted is transferred from [facility] escorted to the unit by security and medical transport via transport chair. Patient is cooperative, but guarded during nursing assessment. Patient states that she stopped taking medications 1 month ago and 6 months ago because they weren't working. Patient states she doesn't have any support. Patient stated she took medications and alcohol together but doesn't know why she took them will continue to follow current POC and interventions per policies/protocols.

## 2020-12-15 NOTE — H&P
[address]  Inpatient Admission Note    Date of Service:  12/15/20    Historian(s): [facility] and chart review  Referral Source: [facility]    Chief Complaint   Drug overdose    History of Present Illness     [facility] is a 35 y.o. WHITE OR  female with a history of hypertension and schizoaffective disorder who was admitted voluntarily from [facility] after overdosing on different medications including prazosin and Zoloft. Patient was unsure of exact amount of pills and which prescriptions she overdosed on. Patient was brought to the ED by EMS after her boyfriend called 911 after she overdosed on medications. EMS reported that the patient and her boyfriend had an argument after which she overdosed and the boyfriend called EMS. Patient had also been drinking and her blood alcohol level was 75 in the emergency department. Patient appears to be a fair historian but is rather guarded and not forthcoming with information. She is unable to say why she overdosed on the medications but consistently insists that it was not a suicide attempt and she was not trying to kill herself. She says she was in a lot of pain and was also very anxious and was therefore \"looking for a quick fix and trying to relax\". She says she had walked over 10 miles that day prior to having an argument with her boyfriend. Her boyfriend had been drinking and was intoxicated and could not provide her transportation to the grocery store when she needed to get groceries, so she was frustrated. Later on she said that she was \"trying to make it easy for my boyfriend to break up with me\". There seems to be some ambivalence about the relationship and she is not really sure she really wants to be with him anymore due to his alcoholism, nevertheless she does not want to be  unsupportive of him. Ms. Berhane Arellano denies any other major stressors.   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 She mentions that she worries a lot and has a lot of anxiety due to physical health issues but is unable to specify what other medical problems she has besides hypertension. She says she always feels that there is something wrong with her body physically or that she may have cancer. She is also worried about the fact that she may not be able to have children. She says someone told her when she was a teenager that she was not going to be able to have children. This person was not a medical professional.  She says she has been trying to get pregnant and has not been able to and has consulted with OB/GYN. The patient reports that she has been dealing with depression for a few years. She says that for the past few weeks she has been consistently dejesus but she denies irritability. She says her mood is \"up and down\" but she denies manic or hypomanic symptoms. She denies problems with sleep or appetite. She says her energy level is variable, her concentration is poor. She denies anhedonia. She says she enjoys doing regular activities such as household chores, playing with her dog and doing things with her boyfriend. She denies auditory hallucinations, visual hallucinations or paranoia. She said she experienced auditory hallucinations several years ago. According to her medical record, the patient has been diagnosed with PTSD and is prescribed prazosin 1 mg at bedtime for nightmares. Patient endorses nightmares. She was unwilling to talk about the past traumatic events. Patient reported that she drinks alcohol occasionally and usually does not drink. Denies use of recreational drugs. Her UDS was negative. Medical Review of Systems     Constitutional: No fever or chills. All other systems reviewed and negative except for what is mentioned in the HPI.     Psychiatric Treatment History     The patient reports that she has been hospitalized several times, at least 6 times in [place]. Her first hospitalization occurred when she was 18 after she overdosed on aspirin. She says she has mainly been hospitalized for drug overdoses or self harming behaviors or suicidal ideation. She reports a history of cutting behavior as a teenager. She has also attempted to hang herself in the past and has had at least 3 prior drug overdoses. She says she has been treated with different mood stabilizers in the past including Seroquel, Abilify, Geodon, BuSpar, sertraline, Prozac. She says she has been diagnosed with multiple diagnoses in the past including major depressive disorder with psychosis, PTSD and schizoaffective disorder. She was seeing a psychiatrist at the [address] [place] [address]. She says she saw her just once and did not feel like she established a good rapport, so she decided to see a psychiatrist in a private clinic. She cannot remember the name of the psychiatrist or the clinic but says she saw her about 3 weeks ago. She reports history of inconsistent outpatient follow-up and noncompliance with outpatient medications for the past 6 months. She did not feel that the regimen of BuSpar, Abilify and Zoloft were not working for her. Allergies    No Known Allergies    Medical History     Past Medical History:   Diagnosis Date    Depression     Hypertension     Iron deficiency anemia     Schizoaffective disorder, bipolar type ([facility] [address]     Self-mutilation     Social anxiety disorder    Neutrophilia      Medication(s)     Prior to Admission Medications   Prescriptions Last Dose Informant Patient Reported? Taking?    ABILIFY MAINTENA 400 mg injection Unknown at Unknown time  Yes No   ARIPiprazole (ABILIFY) 10 mg tablet Unknown at Unknown time  Yes No   Sig: aripiprazole 10 mg tablet   TK 1 T PO IN THE MORNING   amLODIPine (NORVASC) 5 mg tablet Unknown at Unknown time  Yes No   Sig: TK 1 T PO QD   benztropine (COGENTIN) 0.5 mg tablet Unknown at Unknown time  Yes No   Sig: TK 1 T PO BID   busPIRone (BUSPAR) 10 mg tablet Unknown at Unknown time  Yes No   Sig: TK 1 T PO BID   ferrous sulfate 325 mg (65 mg iron) tablet Unknown at Unknown time  Yes No   Sig: TK 1 T PO 1 TIME A DAY   haloperidol (HALDOL) 5 mg tablet Unknown at Unknown time  Yes No   Sig: TK 1 T PO BID   hydroCHLOROthiazide (MICROZIDE) 12.5 mg capsule Unknown at Unknown time  Yes No   Sig: TK 1 C PO QD   prazosin (MINIPRESS) 1 mg capsule Unknown at Unknown time  Yes No   sertraline (ZOLOFT) 100 mg tablet Unknown at Unknown time  Yes No   Sig: TK 1 T PO QD      Facility-Administered Medications: None           Family History     No family history on file. Psychiatric Family History  Patient has a half brother diagnosed with schizophrenia and a sister with narcolepsy. She denies history of suicide attempts in the family. Social History     The patient says she was born and raised in [place]. She was raised mainly by her mother. She was the youngest of 4 siblings growing up at the time at home with her mother but the other siblings were half siblings. Her parents were never . At some point she moved to the [facility] to live with her mother who is originally from the [facility] [facility]. Then she moved to [place] in  to live with her father. Her father  6 years ago of end-stage renal disease. Her mother also  4 years ago of congestive heart failure. The patient states she moved to [address] [place] [address] about 6 months ago to live with her boyfriend. 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 She graduated high school and did 2 years of college. She denies legal history. The patient was guarded about her childhood. She said it was fine.     Vitals/Labs      Vitals:    204 20 2100 12/15/20 0803   BP: (!) 137/98 139/84 125/81 128/82   Pulse: 72 92 75 64   Resp: 18  18 14   Temp: 98.2 °F (36.8 °C)  97.1 °F (36.2 °C) 97.6 °F (36.4 °C)       Labs:   Results for orders placed or performed during the hospital encounter of 12/13/20   CBC WITH AUTOMATED DIFF   Result Value Ref Range    WBC 17.4 (H) 4.6 - 13.2 K/uL    RBC 5.22 4.20 - 5.30 M/uL    HGB 12.1 12.0 - 16.0 g/dL    HCT 36.3 35.0 - 45.0 %    MCV 69.5 (L) 74.0 - 97.0 FL    MCH 23.2 (L) 24.0 - 34.0 PG    MCHC 33.3 31.0 - 37.0 g/dL    RDW 14.5 11.6 - 14.5 %    PLATELET 989 443 - 111 K/uL    MPV 10.0 9.2 - 11.8 FL    NEUTROPHILS 86 (H) 40 - 73 %    LYMPHOCYTES 11 (L) 21 - 52 %    MONOCYTES 3 3 - 10 %    EOSINOPHILS 0 0 - 5 %    BASOPHILS 0 0 - 2 %    ABS. NEUTROPHILS 14.9 (H) 1.8 - 8.0 K/UL    ABS. LYMPHOCYTES 1.9 0.9 - 3.6 K/UL    ABS. MONOCYTES 0.4 0.05 - 1.2 K/UL    ABS. EOSINOPHILS 0.1 0.0 - 0.4 K/UL    ABS. BASOPHILS 0.0 0.0 - 0.1 K/UL    DF AUTOMATED     METABOLIC PANEL, COMPREHENSIVE   Result Value Ref Range    Sodium 138 136 - 145 mmol/L    Potassium 3.4 (L) 3.5 - 5.5 mmol/L    Chloride 106 100 - 111 mmol/L    CO2 22 21 - 32 mmol/L    Anion gap 10 3.0 - 18 mmol/L    Glucose 106 (H) 74 - 99 mg/dL    BUN 15 7.0 - 18 MG/DL    Creatinine 0.84 0.6 - 1.3 MG/DL    BUN/Creatinine ratio 18 12 - 20      GFR est AA >60 >60 ml/min/1.73m2    GFR est non-AA >60 >60 ml/min/1.73m2    Calcium 8.8 8.5 - 10.1 MG/DL    Bilirubin, total <0.1 (L) 0.2 - 1.0 MG/DL    ALT (SGPT) 18 13 - 56 U/L    AST (SGOT) 23 10 - 38 U/L    Alk.  phosphatase 56 45 - 117 U/L    Protein, total 7.8 6.4 - 8.2 g/dL    Albumin 3.8 3.4 - 5.0 g/dL    Globulin 4.0 2.0 - 4.0 g/dL    A-G Ratio 1.0 0.8 - 1.7     ETHYL ALCOHOL   Result Value Ref Range    ALCOHOL(ETHYL),SERUM 75 (H) 0 - 3 MG/DL   HCG QL SERUM   Result Value Ref Range    HCG, Ql. Negative NEG     SALICYLATE   Result Value Ref Range    Salicylate level <2.8 (L) 2.8 - 20.0 MG/DL   ACETAMINOPHEN   Result Value Ref Range    Acetaminophen level <2 (L) 10.0 - 30.0 ug/mL   DRUG SCREEN, URINE   Result Value Ref Range    BENZODIAZEPINES Negative NEG BARBITURATES Negative NEG      THC (TH-CANNABINOL) Negative NEG      OPIATES Negative NEG      PCP(PHENCYCLIDINE) Negative NEG      COCAINE Negative NEG      AMPHETAMINES Negative NEG      METHADONE Negative NEG      HDSCOM (NOTE)    URINALYSIS W/ RFLX MICROSCOPIC   Result Value Ref Range    Color YELLOW      Appearance CLEAR      Specific gravity 1.010 1.005 - 1.030      pH (UA) 5.0 5.0 - 8.0      Protein Negative NEG mg/dL    Glucose Negative NEG mg/dL    Ketone Negative NEG mg/dL    Bilirubin Negative NEG      Blood LARGE (A) NEG      Urobilinogen 0.2 0.2 - 1.0 EU/dL    Nitrites Negative NEG      Leukocyte Esterase Negative NEG     SARS-COV-2   Result Value Ref Range    SARS-CoV-2 Not Detected Not Detected      Specimen source NP SWAB     COVID-19 rapid test Not detected NOTD      Specimen type NP Swab     URINE MICROSCOPIC ONLY   Result Value Ref Range    WBC 0 to 3 0 - 5 /hpf    RBC 0 to 3 0 - 5 /hpf    Epithelial cells 1+ 0 - 5 /lpf    Bacteria 1+ (A) NEG /hpf   EKG, 12 LEAD, INITIAL   Result Value Ref Range    Ventricular Rate 82 BPM    Atrial Rate 82 BPM    P-R Interval 170 ms    QRS Duration 90 ms    Q-T Interval 376 ms    QTC Calculation (Bezet) 439 ms    Calculated P Axis 34 degrees    Calculated R Axis 51 degrees    Calculated T Axis 46 degrees    Diagnosis       Normal sinus rhythm  Normal ECG  No previous ECGs available  Confirmed by Kayce Beckford MD. ([id number]) on 12/15/2020 12:33:52 AM         Mental Status Examination     Appearance/Hygiene 35 y.o. WHITE OR  female who appears her stated age. She seems to be shy and has no eye contact. She talks with her head down.   Hygiene: Good hygiene   Behavior/Social Relatedness  shy or timid, guarded   Musculoskeletal Gait/Station: appropriate  Tone (flaccid, cogwheeling, spastic): Normal  Psychomotor (hyperkinetic, hypokinetic): calm  Involuntary movements (tics, dyskinesias, akathisa, stereotypies): none   Speech   Rate, rhythm, volume, fluency and articulation are appropriate   Mood   depressed   Affect    restricted   Thought Process Linear and goal directed, associations are tight. Thought process disorganized. Vagueness, incoherence, circumstantiality, tangentiality, neologisms, perseveration, flight of ideas, or self-contradictory statements not present on assessment   Thought Content and Perceptual Disturbances Denies delusions, ideas of reference, overvalued ideas, ruminations, obsession, compulsions, and phobias    Denies self-injurious behavior (SIB), suicidal ideation (SI), aggressive behavior or homicidal ideation (HI)    Denies auditory and visual hallucinations   Sensorium and Cognition  A&Ox4, attention intact, memory intact, language use appropriate, concentration is intact and fund of knowledge age appropriate   Insight  Limited   Judgment  fair       Suicide Risk Assessment     Admission  Date/Time: 12/15/20    [x] Admission  [] Discharge     Key Factors:   Current admission precipitated by suicide attempt?   [x]  Yes     2    []  No     1     Suicide Attempt History  [] Past attempts of high lethality    2 [x]  Past attempts of low lethality    1 []  No previous attempts       0   Suicidal Ideation []  Constant suicidal thoughts      2 []  Intermittent or fleeting suicidal  thoughts  1 [x]  Denies current suicidal thoughts    0   Suicide Plan   []  Has plan with actual OR potential access to planned method    2 []  Has plan without access to planned method      1 [x]  No plan            0   Plan Lethality []  Highly lethal plan (Carbon monoxide, gun, hanging, jumping)    2 []  Moderate lethality of plan          1 []  Low lethality of plan (biting, head banging, superficial scratching, pillow over face)  0   Safety Plan Agreement  []  Unwilling OR unable to agree due to impaired reality testing   2   []  Patient is ambivalent and/or guarded      1 [x]  Reliably agrees        0   Current Morbid Thoughts (reunion fantasies, preoccupations with death) []  Constantly     2     []  Frequently    1 [x]  Rarely    0   Elopement Risk  []  High risk     2 []  Moderate risk    1 [x]   Low risk    0   Symptoms    []  Hopeless  []  Helpless  []  Anhedonia   []  Guilt/shame  []  Anger/rage  [x]  Anxiety  []  Insomnia   []  Agitation   [x]  Impulsivity  []  5-6 symptoms present    2 []  3-4 symptoms present    1  [x]  0-2 symptoms present    0     Total Score: 3  --------------------------------------------------------------------------------------------------------------  Subjective Appraisal of Risk:  []  Patient replies not trustworthy: several non-verbal cues. []  Patient replies questionable: trustworthy: at least 1 non-verbal cue. [x]  Patient replies appear trustworthy. Protective measures (select all that apply):  []  Successful past responses to stress  []  Spiritual/Presybeterian beliefs  [x]  Capacity for reality testing  [x]  Positive therapeutic relationships  [x]  Social supports/connections  []  Positive coping skills  []  Frustration tolerance/optimism  []  Children or pets in the home  []  Sense of responsibility to family  [x]  Agrees to treatment plan and follow up    High Risk Diagnoses (select all that apply):  [x]  Depression/Bipolar Disorder  []  Dual Diagnosis  []  Cardiovascular Disease  []  Schizophrenia  []  Chronic Pain  []  Epilepsy  []  Cancer  []  Personality Disorder  []  HIV/AIDS  []  Multiple Sclerosis    Dangerousness Assessment (Suicide, homicide, property destruction. ..)    Risk Factors reviewed and risk assessed to be:  [] low  [] low-moderate  [] moderate   [x] moderate-high  [] high     Protection factors reviewed and risk assessed to be:  [] low  [x] low-moderate  [] moderate   [] moderate-high  [] high     Response to treatment and risk assessed to be:  [x] low  [] low-moderate  [] moderate   [] moderate-high  [] high     Support reviewed and risk assessed to be:  [x] low  [] low-moderate  [] moderate   [] moderate-high  [] high     Acceptance of Discharge and outpatient treatment reviewed and risk assessed to be:    [x] low  [] low-moderate  [] moderate   [] moderate-high  [] high   Overall risk assessed to be:  [] low  [] low-moderate  [] moderate   [x] moderate-high  [] high       Assessment and Plan     Psychiatric Diagnoses:   Patient Active Problem List   Diagnosis Code    Neutrophilia D72.9    Microcytosis R71.8    Severe episode of recurrent major depressive disorder, without psychotic features ([facility] [address] F33.2    PTSD (post-traumatic stress disorder) F43.10       Level of impairment/disability: Severe    Marbella Gillis is a 35 y.o. who is currently requiring acute stabilization after overdosing on Zoloft and prazosin. 1. Admit to locked inpatient behavioral health unit. Start milieu, group, art and occupation therapy. 2. Discontinue Zoloft, BuSpar and Abilify as patient reports his medications are not effective and she has been noncompliant with them. 3. Start Effexor XR 75 mg daily for major depressive disorder and posttraumatic stress disorder. Monitor her blood pressure due to history of hypertension and possible increase in blood pressure with the use of Effexor. 4. Collateral information to be obtained from her boyfriend. 5. Routine labs ordered and reviewed by this provider. 6. Reviewed instructions, risks, benefits and side effects of medication. 7. Start disposition planning; verify upcoming outpatient appointments with therapist and/or psychiatric medication prescriber.    8. Tentative date of discharge: 3-5 days       Arielle Perdue MD  [id number]

## 2020-12-15 NOTE — BSMART NOTE
ART THERAPY GROUP PROGRESS NOTE PATIENT SCHEDULED FOR GROUP AT: [id number] ATTENDANCE: Full PARTICIPATION LEVEL: Participates fully in the art proces ATTENTION LEVEL : Able to focus on task FOCUS: Grounding SYMBOLIC & THEMATIC CONTENT AS NOTED IN IMAGERY: She was calm, compliant, and invested in the task. Her approach was planned-out and purposeful.

## 2020-12-15 NOTE — BSMART NOTE
OCCUPATIONAL THERAPY PROGRESS NOTE Group Time:  9064 Attendance: The patient attended full group. Iván Light Participation: The patient participated with moderate elaboration in the activity. Iván Light Attention: The patient was able to focus on the activity. Interaction: The patient acknowledges others or responds to questions,  with no spontaneous interaction. Responses appropriate and on subject

## 2020-12-15 NOTE — BSMART NOTE
SOCIAL WORK GROUP THERAPY PROGRESS NOTE Group Time:  10:15am   1:15pm 
 
Group Topic:  Coping Skills    C D Issues Group Participation: Actively participated in group discussion and remained attentive. Listened to peers suggestions to focus on self first and less on helping boyfriend. Mood still dysphoric. \"Seven Steps\" for taking responsibility for our Happiness was reviewed including commitment to change, self-care, setting limits, goal setting & letting go. Reviewed strategies to keep a \"Journal\" for moods, cognitions, behavior & outcome.

## 2020-12-15 NOTE — BH NOTES
Patient received medication education for Effexor 75mg cap. Patient given opportunity for questions/concerns which were addressed at this time.

## 2020-12-15 NOTE — H&P
History and Physical        Patient: Sari Kellogg               Sex: female          DOA: 12/14/2020         YOB: 1990      Age:  35 y.o.        LOS:  LOS: 1 day        HPI:     Sari Kellogg is a 35 y.o. female who was admitted experiencing depression and   suicidal ideation after overdosing on medications and she also had a alcohol level of 75. Principal Problem:    Severe episode of recurrent major depressive disorder, without psychotic features ([facility] [address] (12/15/2020)    Active Problems:    PTSD (post-traumatic stress disorder) (12/15/2020)        Past Medical History:   Diagnosis Date    Depression     Hypertension     Iron deficiency anemia     Schizoaffective disorder, bipolar type ([facility] [address]     Self-mutilation     Social anxiety disorder        Past Surgical History:   Procedure Laterality Date    HX OTHER SURGICAL      right shoulder surgery. No family history on file. Social History     Socioeconomic History    Marital status: SINGLE     Spouse name: Not on file    Number of children: Not on file    Years of education: Not on file    Highest education level: Not on file   Tobacco Use    Smoking status: Never Smoker    Smokeless tobacco: Never Used   Substance and Sexual Activity    Alcohol use: No    Drug use: Yes     Frequency: 1.0 times per week     Types: Marijuana    Sexual activity: Not Currently       Prior to Admission medications    Medication Sig Start Date End Date Taking?  Authorizing Provider   ABILIFY MAINTENA 400 mg injection  9/21/19   Provider, Historical   prazosin (MINIPRESS) 1 mg capsule  10/1/19   Provider, Historical   ARIPiprazole (ABILIFY) 10 mg tablet aripiprazole 10 mg tablet   TK 1 T PO IN THE MORNING    Provider, Historical   amLODIPine (NORVASC) 5 mg tablet TK 1 T PO QD 9/26/17   Provider, Historical   benztropine (COGENTIN) 0.5 mg tablet TK 1 T PO BID 9/5/17   Provider, Historical   busPIRone (BUSPAR) 10 mg tablet TK 1 T PO BID 9/26/17 Provider, Historical   ferrous sulfate 325 mg (65 mg iron) tablet TK 1 T PO 1 TIME A DAY 9/26/17   Provider, Historical   haloperidol (HALDOL) 5 mg tablet TK 1 T PO BID 9/11/17   Provider, Historical   hydroCHLOROthiazide (MICROZIDE) 12.5 mg capsule TK 1 C PO QD 9/26/17   Provider, Historical   sertraline (ZOLOFT) 100 mg tablet TK 1 T PO QD 9/26/17   Provider, Historical       No Known Allergies    Review of Systems  A comprehensive review of systems was negative except for that written in the History of Present Illness. Physical Exam:      Visit Vitals  /82 (BP 1 Location: Right arm, BP Patient Position: Sitting)   Pulse 64   Temp 97.6 °F (36.4 °C)   Resp 14       Physical Exam:  Physical Exam:   General:  Alert, cooperative, obese, no distress, appears stated age. Eyes:  Conjunctivae/corneas clear. PERRL, EOMs intact. Fundi benign   Ears:  Normal TMs and external ear canals both ears. Nose: Nares normal. Septum midline. Mucosa normal. No drainage or sinus tenderness. Mouth/Throat: Lips, mucosa, and tongue normal. Teeth and gums normal.   Neck: Supple, symmetrical, trachea midline, no adenopathy, thyroid: no enlargement/tenderness/nodules, no carotid bruit and no JVD. Back:   Symmetric, no curvature. ROM normal. No CVA tenderness. Lungs:   Clear to auscultation bilaterally. Heart:  Regular rate and rhythm, S1, S2 normal, no murmur, click, rub or gallop. Abdomen:   Soft, non-tender. Bowel sounds normal. No masses,  No organomegaly. Extremities: Extremities normal, atraumatic, no cyanosis or edema. Pulses: 2+ and symmetric all extremities. Skin: Skin color, texture, turgor normal. No rashes or lesions   Lymph nodes: Cervical, supraclavicular, and axillary nodes normal.   Neurologic: CNII-XII intact. Normal strength, sensation and reflexes throughout. Assessment/Plan     Patient was cooperative and pleasant.  Plan is for patient to participate in all unit activities,  take medications as ordered and follow all discharge orders.

## 2020-12-15 NOTE — BSMART NOTE
[address] [facility] Biopsychosocial Assessment Current Level of Psychosocial Functioning  
 
[x]Independent 
[]Dependent []Minimal Assist 
 
 
Comments:   
 
Psychosocial High Risk Factors (check all that apply) []Unable to obtain meds []Chronic illness/pain   
[x]Substance abuse  
[]Lack of Family Support []Financial stress []Isolation []Inadequate [employer] [x]Suicide attempt(s) [x]Not taking medications []Victim of crime []Developmental Delay 
[]Unable to manage personal needs []Age 72 or older  
[]  Homeless []George transportation []Readmission within 30 days []Unemployment []Traumatic Event Psychiatric Advanced Directive:None Family to involve in treatment:possibly boyfriend Sexual Orientation:  heterosexual 
 
Patient Strengths:polite, verbal, asks for help Patient Barriers: guarded, poor outpt compliance CD education provided: both in IT and Group. Safety plan:will contract with nursing staff for safety CMHC/MH history: hx both inpt & outpt tx Plan of Care: 
medication management, group/individual therapies, family meetings, psycho -education, treatment team meetings to assist with stabilization Initial Discharge Plan:   
 
Clinical Summary: Pt is a 35 y.o. female with PMHX of schizoaffective disorder who presents to the emergency department C/O redosed. Patient reports that around 8 or 9 PM she took multiple medications from her prescribed medicines and drink some alcohol. She is not sure what all she took but does state that she took some of her prazosin and some of her sertraline. She cannot quantify how much she took. She states she does not know why she took the medications but that she has been under increased stress of wanting to go to sleep. She denies suicidality. EMS reports that there was a verbal altercation between her and her boyfriend and her boyfriend to activated 911. Pt has had several attempts to harm self & inconsistent with outpt tx. Psychiatric Diagnoses:  
    
Patient Active Problem List  
Diagnosis Code  Neutrophilia D72.9  Microcytosis R71.8  Severe episode of recurrent major depressive disorder, without psychotic features ([facility] [address] F33.2  PTSD (post-traumatic stress disorder) F43.10 Intervention: after explaining my role on tx team, pt gradually relaxed & was more open about impact boyfriends drinking has had on her & their relationship. Asked a lot of questions on Alcohol & we looked at strategies to cope. She is recommitting to return to outpt providers. We began looking at basic CBT strategies & setting boundaries & limits. Also encouraged Toya to strengthen support network. Pt HAS place to live. Dr & tx team updated.

## 2020-12-16 PROCEDURE — 74011250637 HC RX REV CODE- 250/637: Performed by: PSYCHIATRY & NEUROLOGY

## 2020-12-16 PROCEDURE — 65220000003 HC RM SEMIPRIVATE PSYCH

## 2020-12-16 PROCEDURE — 99231 SBSQ HOSP IP/OBS SF/LOW 25: CPT | Performed by: PSYCHIATRY & NEUROLOGY

## 2020-12-16 RX ADMIN — HYDROXYZINE PAMOATE 50 MG: 50 CAPSULE ORAL at 14:20

## 2020-12-16 RX ADMIN — VENLAFAXINE HYDROCHLORIDE 75 MG: 75 CAPSULE, EXTENDED RELEASE ORAL at 08:41

## 2020-12-16 RX ADMIN — AMLODIPINE BESYLATE 5 MG: 5 TABLET ORAL at 08:41

## 2020-12-16 RX ADMIN — PRAZOSIN HYDROCHLORIDE 1 MG: 1 CAPSULE ORAL at 20:05

## 2020-12-16 NOTE — BH NOTES
MHT Note    The writer observed patient pacing the hallway. Patient asked to be woke up for group. Patient ate her meals. Patient attended all groups throughout shift. Patient was asking for multiple items throughout the shift. No behaviors to observe. No falls to report. Patient will continue to be observed.

## 2020-12-16 NOTE — BH NOTES
Patient was given PRN Vistaril 25 mg cap PO for complaint of anxiousness. Patient declined 1:1 with this nurse for therapeutic communication. Patient reassured \"please let me know if you change your mind. \"  Patient has been in day area most of day shift, has attended all groups and activities and has not been seen to initiate conversation with peers but is able to hold conversation if someone approaches.

## 2020-12-16 NOTE — PROGRESS NOTES
conducted an Spirituality Group for [facility], who is a 35 y.o.,female. Patient's Primary Language is: [place]. According to the patient's EMR Rastafari Affiliation is: No preference. The reason the Patient came to the hospital is:   Patient Active Problem List    Diagnosis Date Noted    Severe episode of recurrent major depressive disorder, without psychotic features ([facility] [address] 12/15/2020    PTSD (post-traumatic stress disorder) 12/15/2020    Neutrophilia 10/11/2017    Microcytosis 10/11/2017       conducted Spirituality Group Ex #51 \"Shoes I Fill/Steps I Take\"; patient was one of 6 participants/8 on Floor. The  provided the following Interventions:  Initiated a relationship of care and support. Listened empathically. Discussed challenges of life situations and possible opportunities of being in treatment. Offered prayer and assurance of continued prayer on patient's behalf in group. The following outcomes were achieved:  Patient shared in group discussion thoughts and life concerns.  provided spiritual ideas in life coping skills. Patient expressed gratitude for chaplains' visit. Assessment:  There are no known further spiritual or Sikh issues which require Spiritual Care Services interventions at this time. Plan:  Chaplains will continue to follow and will provide pastoral care on an as needed/requested basis. Chaplains will follow up with spiritual material as requested.  recommends bedside caregivers page  on duty if patient shows signs of acute spiritual or emotional distress.      [address] [place] [address]   [phone]

## 2020-12-16 NOTE — PROGRESS NOTES
Problem: Suicide  Goal: *STG: Remains safe in hospital  Description: Patient to remain safe every day while hospitalized. Outcome: Progressing Towards Goal  Goal: *STG: Attends activities and groups  Description: Patient to attend 2-3 every day while hospitalized. Outcome: Progressing Towards Goal     Problem: Falls - Risk of  Goal: *Absence of Falls  Description: Patient to be absence of falls every day while hospitalized. Outcome: Progressing Towards Goal  Note: Fall Risk Interventions:            Medication Interventions: Teach patient to arise slowly              Peyton Camp is a 35 y.o. Female admitted for No diagnosis found. that presented today as is restless. Patient has been active with groups, has been Positive for medications, has eaten % of meals, and has been follows multi-step simple commands/direction staff direction. RN's will initiate, develop, implement, review, and revise treatment plans as necessary. Will continue to provide education, redirection, and support as needed or verbalized by patient.    Signed By: Zeke Faria     December 16, 2020

## 2020-12-16 NOTE — BH NOTES
The documentation for downtime was entered following the guidelines, as defined in the [address] downtime policy. Downtime sheets are in the PT chart.

## 2020-12-16 NOTE — GROUP NOTE
[facility] GROUP DOCUMENTATION INDIVIDUAL Group Therapy Note Date: 12/16/2020 Group Start Time: 8114 Group End Time: 6228 Group Topic: Nursing [facility] ADULT CHEM DEP Teresa Borrero, RN 
 
[facility] GROUP DOCUMENTATION GROUP Group Therapy Note:  Coping skills through music Attendees: 7 Attendance: Attended Interventions/techniques: Informed, Validated and Supported Follows Directions: Followed directions Interactions: Interacted appropriately Mental Status: Anxious and Congruent Behavior/appearance: Cooperative and Needed prompting Goals Achieved: Able to listen to others, Able to reflect/comment on own behavior and Identified feelings Additional Notes:  Patient was able to listen to songs and put feelings of each into words. [facility]

## 2020-12-16 NOTE — BSMART NOTE
Social Work Group Coping Skills Group Attendance Number of participants 6 Time in 2:45 Time out 3:15 Total Time 30 Interaction Appropriate; actively participated Interventions/techniques Informed and encouraged, Provided positive feedback and support

## 2020-12-16 NOTE — BH NOTES
Pt appeared to have slept for 6.50 hours thus far; woke up x 1 to get a drink of water. Will continue to monitor for safety.

## 2020-12-16 NOTE — BSMART NOTE
ART THERAPY GROUP PROGRESS NOTE PATIENT SCHEDULED FOR GROUP AT: 1330 ATTENDANCE: Full PARTICIPATION LEVEL: Participates fully in the art process ATTENTION LEVEL : Able to focus on task FOCUS:Positive affirmations SYMBOLIC & THEMATIC CONTENT AS NOTED IN IMAGERY: She was calm, compliant, and invested in the task at hand. She participated minimally in group discussion and shared themes of hope in her imagery.

## 2020-12-16 NOTE — PROGRESS NOTES
[address] [address] [facility] [address]  Inpatient Progress Note     Date of Service: 12/16/20  Hospital Day: 2     Subjective/Interval History   12/16/20    Treatment Team Notes:  Notes reviewed and/or discussed and report that Priti Serna is a 62-HFEC-QMJ female admitted after drug overdose. No acute events overnight. Patient has been withdrawn to her room but has been attending groups. Patient interview: Priti Serna was interviewed by this writer today. She was able to make better eye contact today and affect seemed brighter. Patient reports she is feeling better today in terms of her mood. She is feeling more hopeful about the future and denies suicidal ideation. She spoke with her boyfriend yesterday and he acknowledges need for help with his alcoholism. She denies problems with sleep, appetite or energy level. Supportive therapy provided. We attempted to discuss coping skills. Patient says she will use coping skills she has learned in DBT group therapy sessions in the past which include meditation, deep breathing and distraction. She is looking forward to being discharged and going back to work. She is tolerating Effexor so far without adverse reaction. Objective     Visit Vitals  BP (!) 133/94 (BP 1 Location: Right arm)   Pulse 62   Temp 98.2 °F (36.8 °C)   Resp 14       No results found for this or any previous visit (from the past 24 hour(s)). Mental Status Examination     Appearance/Hygiene 35 y.o.  WHITE OR  female  Hygiene: good   Behavior/Social Relatedness Appropriate   Musculoskeletal Gait/Station: appropriate  Tone (flaccid, cogwheeling, spastic): normal  Psychomotor (hyperkinetic, hypokinetic): calm   Involuntary movements (tics, dyskinesias, akathisa, stereotypies): none   Speech   Rate, rhythm, volume, fluency and articulation are appropriate   Mood   euthymic   Affect    congruent   Thought Process Linear and goal directed   Thought Content and Perceptual Disturbances Denies self-injurious behavior (SIB), suicidal ideation (SI), aggressive behavior or homicidal ideation (HI)    Denies auditory and visual hallucinations   Sensorium and Cognition  Grossly intact   Insight  fair   Judgment  fair        Assessment/Plan      Psychiatric Diagnoses:   Patient Active Problem List   Diagnosis Code    Neutrophilia D72.9    Microcytosis R71.8    Severe episode of recurrent major depressive disorder, without psychotic features ([facility] [address] F33.2    PTSD (post-traumatic stress disorder) F15.29       Rashida Vega is a 35 y.o. who is currently admitted for suicidal ideations and drug overdose, appears to be doing slightly better. 1.  Continue current medication regimen without changes. Effexor XR 75 mg daily for depression and PTSD. Prazosin 1 mg at bedtime for nightmares. 2.  Reviewed instructions, risks, benefits and side effects of medications  3. Disposition/Discharge Date: self-care/home, possible Friday discharge.     MD DR. [facility]  Psychiatry

## 2020-12-16 NOTE — BSMART NOTE
OCCUPATIONAL THERAPY PROGRESS NOTE Group Time:  4311 Attendance: The patient attended full group. Participation: The patient participated with moderate elaboration in the activity. Attention: The patient was able to focus on the activity. Interaction: The patient occasionally  interacts with others.

## 2020-12-17 PROCEDURE — 99231 SBSQ HOSP IP/OBS SF/LOW 25: CPT | Performed by: PSYCHIATRY & NEUROLOGY

## 2020-12-17 PROCEDURE — 65220000003 HC RM SEMIPRIVATE PSYCH

## 2020-12-17 PROCEDURE — 74011250637 HC RX REV CODE- 250/637: Performed by: PSYCHIATRY & NEUROLOGY

## 2020-12-17 RX ADMIN — VENLAFAXINE HYDROCHLORIDE 75 MG: 75 CAPSULE, EXTENDED RELEASE ORAL at 08:47

## 2020-12-17 RX ADMIN — PRAZOSIN HYDROCHLORIDE 1 MG: 1 CAPSULE ORAL at 20:57

## 2020-12-17 RX ADMIN — AMLODIPINE BESYLATE 5 MG: 5 TABLET ORAL at 08:47

## 2020-12-17 NOTE — BSMART NOTE
SOCIAL WORK GROUP THERAPY PROGRESS NOTE Group Time:  10:30am 
 
Group Topic:  Coping Skills    C D Issues Group Participation:   
 
Pt moderately involved during group discussion but remained attentive. Affect brighter, hoping for d/c soon. Emphasis of session was presentation of basic \"CBT\" principles including diagram of the process, as well as list of typical cognitive distortions. Pt admitted she does jump to conclusions & will over generalize too much at times, which interferes with setting limits on self. Differences between Assertive, Aggressive & Non-Assertive Behaviors also looked at.

## 2020-12-17 NOTE — BSMART NOTE
OCCUPATIONAL THERAPY PROGRESS NOTE Group Time:  6045 Attendance: The patient attended full group. Participation: The patient participated with moderate elaboration in the activity. Attention: The patient was able to focus on the activity. Interaction: The patient occasionally  interacts with others. Able to ID some positive thinking statements she can use.

## 2020-12-17 NOTE — BSMART NOTE
SW Contact:   
 
Clinical Summary:   
Pt is a 35 y.o. female with PMHX of schizoaffective disorder who presents to the emergency department C/O redosed. Michela Prince reports that around 8 or 9 PM she took multiple medications from her prescribed medicines and drink some alcohol.  She is not sure what all she took but does state that she took some of her prazosin and some of her sertraline.  She cannot quantify how much she took.  She states she does not know why she took the medications but that she has been under increased stress of wanting to go to sleep.  She denies suicidality.  EMS reports that there was a verbal altercation between her and her boyfriend and her boyfriend to activated 911.  
  
Pt has had several attempts to harm self & inconsistent with outpt tx. Intervention: today's focus was more on her addressing in outpt both her mental health & alcohol issues. We reviewed CBT principles & emphasized to pt how gaining insight into the relationship with her thoughts to her moods, especially trying to journal some would help her form positive strategies for change. Also looked at handout on \"Powerlessness\" & to fill out tonight with feedback tomorrow. Also reminded pt to focus on HER issues & NOT boyfriends. Did however some strategies he can use to start working on his stuff. Tentative plan will be return to [facility] for outpt tx Dr & tx team updated.

## 2020-12-17 NOTE — BSMART NOTE
ART THERAPY GROUP PROGRESS NOTE PATIENT SCHEDULED FOR GROUP AT: 779 ATTENDANCE: Full PARTICIPATION LEVEL: Participates fully in the art process ATTENTION LEVEL : Able to focus on task FOCUS: Creative expression SYMBOLIC & THEMATIC CONTENT AS NOTED IN IMAGERY: She presented with a brighter affect than noted in previous art therapy groups and was invested in the task at hand. She actively participated and offered encouragement to others.

## 2020-12-17 NOTE — PROGRESS NOTES
Problem: Suicide  Goal: *STG: Remains safe in hospital  Description: Patient to remain safe every day while hospitalized. 12/17/2020 1415 by Vasile Yoder RN  Outcome: Progressing Towards Goal  12/17/2020 1326 by Vasile Yoder RN  Outcome: Progressing Towards Goal  Goal: *STG: Attends activities and groups  Description: Patient to attend 2-3 every day while hospitalized. 12/17/2020 1415 by Vasile Yoder RN  Outcome: Progressing Towards Goal  12/17/2020 1326 by Vasile Yoder RN  Outcome: Progressing Towards Goal  Goal: Interventions  12/17/2020 1415 by Vasile Yoder RN  Outcome: Progressing Towards Goal  12/17/2020 1326 by Vasile Yoder RN  Outcome: Progressing Towards Goal     Problem: Falls - Risk of  Goal: *Absence of Falls  Description: Patient to be absence of falls every day while hospitalized. 12/17/2020 1415 by Vasile Yoder RN  Outcome: Progressing Towards Goal  Note: Fall Risk Interventions:            Medication Interventions: Teach patient to arise slowly                12/17/2020 1326 by Vasile Yoder RN  Outcome: Progressing Towards Goal  Note: Fall Risk Interventions:            Medication Interventions: Teach patient to arise slowly                   Problem: Depressed Mood (Adult/Pediatric)  Goal: *STG: Participates in treatment plan  Description: Patient to participate in treatment every day while hospitalized. 12/17/2020 1415 by Vasile Yoder RN  Outcome: Progressing Towards Goal  12/17/2020 1326 by Vasile Yoder RN  Outcome: Progressing Towards Goal  Goal: *STG: Complies with medication therapy  Description: Patient to take medications as prescribed every day while hospitalized. 12/17/2020 1415 by Vasile Yoder RN  Outcome: Progressing Towards Goal  12/17/2020 1326 by Vasile Yoder RN  Outcome: Progressing Towards Goal   Dallin Nelson is a 35 y.o. Female admitted for No diagnosis found. that presented today as shows no evidence of impairment.  Patient has been active with groups, has been Positive for medications, has eaten % of meals, and has been follows one step commands/direction staff direction. RN's will initiate, develop, implement, review, and revise treatment plans as necessary. Will continue to provide education, redirection, and support as needed or verbalized by patient.    Signed By: Shereen Blackburn RN     December 17, 2020

## 2020-12-17 NOTE — PROGRESS NOTES
Problem: Suicide  Goal: *STG: Remains safe in hospital  Description: Patient to remain safe every day while hospitalized. Outcome: Progressing Towards Goal  Goal: *STG: Attends activities and groups  Description: Patient to attend 2-3 every day while hospitalized. Outcome: Progressing Towards Goal  Goal: Interventions  Outcome: Progressing Towards Goal     Problem: Falls - Risk of  Goal: *Absence of Falls  Description: Patient to be absence of falls every day while hospitalized. Outcome: Progressing Towards Goal  Note: Fall Risk Interventions:            Medication Interventions: Teach patient to arise slowly                   Problem: Depressed Mood (Adult/Pediatric)  Goal: *STG: Participates in treatment plan  Description: Patient to participate in treatment every day while hospitalized. Outcome: Progressing Towards Goal  Goal: *STG: Complies with medication therapy  Description: Patient to take medications as prescribed every day while hospitalized. Outcome: Progressing Towards Goal   Zulema Prado is a 35 y.o. Female admitted for No diagnosis found. that presented today as shows no evidence of impairment. Patient has been active with groups, has been Positive for medications, has eaten % of meals, and has been follows one step commands/direction staff direction. RN's will initiate, develop, implement, review, and revise treatment plans as necessary. Will continue to provide education, redirection, and support as needed or verbalized by patient.    Signed By: Paulina Dleacruz RN     December 17, 2020

## 2020-12-17 NOTE — BH NOTES
Pt appeared to have slept for 6 hours thus far; pleasant upon approach. She came out, spoke to staff and got a drink from the refrigerator. Will continue to monitor for safety.

## 2020-12-17 NOTE — PROGRESS NOTES
[address] [facility] [address]  Inpatient Progress Note     Date of Service: 12/17/20  Hospital Day: 3     Subjective/Interval History   12/17/20    Treatment Team Notes:  Notes reviewed and/or discussed and report that Keri Crandall is a 36-SRAT-YMN female admitted after drug overdose. No acute events overnight. Patient has been withdrawn to her room but has been attending groups. Patient interview: Keri Crandall was interviewed by this writer today during treatment team in the presence of , nurse and clinical care coordinator. Patient rates depression as 2 on a scale of 1-10 with 10 being severe and suicidal.  She says she is feeling better in terms of mood and more hopeful. She plans to use coping skills to distract herself from negative thoughts. She plans to be more consistent with outpatient treatment. She denies suicidal ideations. She has been attending groups with very good participation according to therapist.  She is tolerating current medication regimen. Blood pressure slightly elevated today. We will continue to monitor      Objective     Visit Vitals  BP (!) 133/94 (BP 1 Location: Left arm, BP Patient Position: Sitting)   Pulse (!) 57   Temp 98.2 °F (36.8 °C)   Resp 16       No results found for this or any previous visit (from the past 24 hour(s)). Mental Status Examination     Appearance/Hygiene 35 y.o.  WHITE OR  female  Hygiene: good   Behavior/Social Relatedness Appropriate   Musculoskeletal Gait/Station: appropriate  Tone (flaccid, cogwheeling, spastic): normal  Psychomotor (hyperkinetic, hypokinetic): calm   Involuntary movements (tics, dyskinesias, akathisa, stereotypies): none   Speech   Rate, rhythm, volume, fluency and articulation are appropriate   Mood   euthymic   Affect    congruent   Thought Process Linear and goal directed   Thought Content and Perceptual Disturbances Denies self-injurious behavior (SIB), suicidal ideation (SI), aggressive behavior or homicidal ideation (HI)    Denies auditory and visual hallucinations   Sensorium and Cognition  Grossly intact   Insight  fair   Judgment  fair        Assessment/Plan      Psychiatric Diagnoses:   Patient Active Problem List   Diagnosis Code    Neutrophilia D72.9    Microcytosis R71.8    Severe episode of recurrent major depressive disorder, without psychotic features ([facility] [address] F33.2    PTSD (post-traumatic stress disorder) V30.51       Nitish Daniels is a 35 y.o. who is currently admitted for suicidal ideations and drug overdose, appears to be doing slightly better. 1.  Continue current medication regimen without changes. Effexor XR 75 mg daily for depression and PTSD. Prazosin 1 mg at bedtime for nightmares. 2.  Reviewed instructions, risks, benefits and side effects of medications  3. Disposition/Discharge Date: self-care/home, possible Friday discharge.     Alejandro Brooks MD DR. [facility]  Psychiatry

## 2020-12-17 NOTE — BH NOTES
Pt up to dayroom x1 for a brief period. She appeared oriented and offered no complaints of discomfort.

## 2020-12-17 NOTE — BH NOTES
Treatment team met -     Medical Director: _____present   Psychiatrist: __x___present   Charge nurse: _x____present   MSW: __x___present   : _____present   Nurse Manager: _____present   Student RNs: _____present   Medical Students: _____present   Art Therapist: _____present   Clinical Coordinator: __x___present    Occupational Therapist: _____present   : _______ present  UR  _______ present  Crisis Supervisor_______present      Plan of care discussed and updated as appropriate. Patient was present for her treatment team meeting. She was pleasant. When asked patient how she was feeling \" overall I'm feeling better\". Patient stated having \" a better grasp \" of what she need to do. She denied ideations. Stated having an outpatient therapist. Stated she's sleeping, going to groups and medication compliant.

## 2020-12-18 VITALS
DIASTOLIC BLOOD PRESSURE: 96 MMHG | OXYGEN SATURATION: 100 % | RESPIRATION RATE: 16 BRPM | TEMPERATURE: 97.6 F | HEART RATE: 62 BPM | SYSTOLIC BLOOD PRESSURE: 131 MMHG

## 2020-12-18 PROCEDURE — 74011250637 HC RX REV CODE- 250/637: Performed by: PSYCHIATRY & NEUROLOGY

## 2020-12-18 PROCEDURE — 99239 HOSP IP/OBS DSCHRG MGMT >30: CPT | Performed by: PSYCHIATRY & NEUROLOGY

## 2020-12-18 RX ORDER — VENLAFAXINE HYDROCHLORIDE 75 MG/1
75 CAPSULE, EXTENDED RELEASE ORAL DAILY
Qty: 30 CAP | Refills: 0 | Status: SHIPPED | OUTPATIENT
Start: 2020-12-19

## 2020-12-18 RX ORDER — PRAZOSIN HYDROCHLORIDE 2 MG/1
2 CAPSULE ORAL
Qty: 30 CAP | Refills: 0 | Status: SHIPPED | OUTPATIENT
Start: 2020-12-18

## 2020-12-18 RX ADMIN — VENLAFAXINE HYDROCHLORIDE 75 MG: 75 CAPSULE, EXTENDED RELEASE ORAL at 08:18

## 2020-12-18 RX ADMIN — AMLODIPINE BESYLATE 5 MG: 5 TABLET ORAL at 08:18

## 2020-12-18 NOTE — BH NOTES
Pt appeared to have slept for 6.25  hours thus far; came out of her room x 1, spoke with staff, looked at the time, and drank a cup of water. Will continue to monitor for safety.

## 2020-12-18 NOTE — BH NOTES
Discharge Note                Patient received discharge instructions and verbalized instruction. Writer also offered patient flu immunization education and patient said No (as she verbalized already having received it) and so the flu immunization was not administered. All patient belongings have been returned:  Dental Appliance: Dental Appliances: None  Vision: Visual Aid: None  Hearing Aid:    Jewelry: Jewelry: None  Clothing: Clothing: Footwear, Pants, Shirt, Jacket/Coat  Other Valuables: Other Valuables: Cell Phone  Valuables sent to safe:    Patient armband removed and shredded, and was escorted out of facility, ambulatory, with staff.

## 2020-12-18 NOTE — DISCHARGE INSTRUCTIONS
Patient Education        Preventing Depression From Coming Back: Care Instructions  Overview     Some people have depression symptoms that come back. Depression often comes and goes during a lifetime. But there are many things you can do to keep it from coming back. Follow-up care is a key part of your treatment and safety. Be sure to make and go to all appointments, and call your doctor if you are having problems. It's also a good idea to know your test results and keep a list of the medicines you take. What do you need to know? Know your risk of depression coming back  Many things can make a person more likely to have depression again. These include having depression symptoms that continue after treatment, a previous episode of depression, and a history of childhood abuse or neglect. It is important to know your risk and to recognize warning signs of depression symptoms returning. Once you know these things, you will be better able to keep it from happening to you. Know the warning signs of depression returning  The two most common signs of depression coming back are:  · Feeling sad or hopeless. · Losing interest in your daily activities. You may have other symptoms, such as:  · You eat more or less than usual.  · You sleep too much or not enough. · You feel restless and unable to sit still. · You feel unable to move. · You feel tired all the time. · You feel unworthy or guilty without an obvious reason. · You have problems concentrating, remembering, or making decisions. · You think often about death or suicide. · You feel angry or have panic attacks. How can you care for yourself at home? · Take your medicine as prescribed. Call your doctor if you have any problems with your medicine. · Continue to take your medicine after your symptoms improve. Taking your medicine for at least 6 months after you feel better can help keep you from getting depressed again.  If your depression keeps coming back, your doctor may recommend you take medicine even longer. · Continue counseling. It may help prevent depression from returning, especially if you've had multiple episodes of depression. Talk with your counselor if you are having a hard time attending your sessions or you think the sessions aren't working. Don't just stop going. · Eat healthy foods. Include fruits, vegetables, beans, and whole grains in your diet each day. · Get regular exercise. Go for a walk or jog, ride your bike, or play sports with friends. · See your doctor right away if you have new symptoms or feel that your depression is coming back. · Keep a regular sleep schedule. Try for 8 hours of sleep a night. · Avoid using illegal drugs or marijuana and drinking alcohol. · If you or someone you know talks about suicide, self-harm, or feeling hopeless, get help right away. Call the [address] [place] [address] at 1-867-263-LHOP ([phone]) or text HOME to 090468 to access the Crisis Text Line. Consider saving these numbers in your phone. When should you call for help? Call 911 anytime you think you may need emergency care. For example, call if:    · You are thinking about suicide or are threatening suicide.     · You feel you cannot stop from hurting yourself or someone else.     · You hear or see things that aren't real.     · You think or speak in a bizarre way that is not like your usual behavior. Call your doctor now or seek immediate medical care if:    · You are drinking a lot of alcohol or using illegal drugs.     · You are talking or writing about death.    Watch closely for changes in your health, and be sure to contact your doctor if:    · You find it hard or it's getting harder to deal with school, a job, family, or friends.     · You think your treatment is not helping or you are not getting better.     · Your symptoms get worse or you get new symptoms.     · You have any problems with your antidepressant medicines, such as side effects, or you are thinking about stopping your medicine.     · You are having manic behavior, such as having very high energy, needing less sleep than normal, or showing risky behavior such as spending money you don't have or abusing others verbally or physically. Where can you learn more? Go to http://www.gray.com/  Enter C630 in the search box to learn more about \"Preventing Depression From Coming Back: Care Instructions. \"  Current as of: January 31, 2020               Content Version: 12.6  © 8566-2738 [employer]. Care instructions adapted under license by [employer] (which disclaims liability or warranty for this information). If you have questions about a medical condition or this instruction, always ask your healthcare professional. [address] any warranty or liability for your use of this information.

## 2020-12-18 NOTE — DISCHARGE SUMMARY
[facility]  Inpatient Psychiatry   Discharge Summary     Admit date: 12/14/2020    Discharge date and time: 12/18/2020  2:03 PM    Discharge Physician: Shaunna Flores MD    DISCHARGE DIAGNOSES     Psychiatric Diagnoses:   Patient Active Problem List   Diagnosis Code    Neutrophilia D72.9    Microcytosis R71.8    Severe episode of recurrent major depressive disorder, without psychotic features ([facility] [address] F33.2    PTSD (post-traumatic stress disorder) F43.10       Level of impairment/disability:  None    HOSPITAL COURSE   Abdirizak Villafuerte is a 35 y.o. WHITE OR  female with a history of hypertension and schizoaffective disorder who was admitted voluntarily from [facility] after overdosing on different medications including prazosin and Zoloft. Patient was unsure of exact amount of pills and which prescriptions she overdosed on. Patient was brought to the ED by EMS after her boyfriend called 911 after she overdosed on medications. EMS reported that the patient and her boyfriend had an argument after which she overdosed and the boyfriend called EMS. Patient had also been drinking and her blood alcohol level was 75 in the emergency department.     Patient appears to be a fair historian but is rather guarded and not forthcoming with information. She is unable to say why she overdosed on the medications but consistently insists that it was not a suicide attempt and she was not trying to kill herself. She says she was in a lot of pain and was also very anxious and was therefore \"looking for a quick fix and trying to relax\". She says she had walked over 10 miles that day prior to having an argument with her boyfriend. Her boyfriend had been drinking and was intoxicated and could not provide her transportation to the grocery store when she needed to get groceries, so she was frustrated. Later on she said that she was \"trying to make it easy for my boyfriend to break up with me\". There seems to be some ambivalence about the relationship and she is not really sure she really wants to be with him anymore due to his alcoholism, nevertheless she does not want to be  unsupportive of him. Ms. Estelita Zabala denies any other major stressors. 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 She mentions that she worries a lot and has a lot of anxiety due to physical health issues but is unable to specify what other medical problems she has besides hypertension. She says she always feels that there is something wrong with her body physically or that she may have cancer. She is also worried about the fact that she may not be able to have children. She says someone told her when she was a teenager that she was not going to be able to have children. This person was not a medical professional.  She says she has been trying to get pregnant and has not been able to and has consulted with OB/GYN.     The patient reports that she has been dealing with depression for a few years. She says that for the past few weeks she has been consistently dejesus but she denies irritability. She says her mood is \"up and down\" but she denies manic or hypomanic symptoms. She denies problems with sleep or appetite. She says her energy level is variable, her concentration is poor. She denies anhedonia. She says she enjoys doing regular activities such as household chores, playing with her dog and doing things with her boyfriend. She denies auditory hallucinations, visual hallucinations or paranoia. She said she experienced auditory hallucinations several years ago.     According to her medical record, the patient has been diagnosed with PTSD and is prescribed prazosin 1 mg at bedtime for nightmares. Patient endorses nightmares.   She was unwilling to talk about the past traumatic events.     Patient reported that she drinks alcohol occasionally and usually does not drink. Denies use of recreational drugs. Her UDS was negative.     Hospital course: The patient was admitted and monitored for suicidal ideation. She was remorseful of suicide attempt and kept denying that it was not really a suicide attempt but she was just trying to relax and sleep. She received individual, group and medication therapy. Her home medications were discontinued as patient reported that she had not been taking them due to feeling that they were not effective. Effexor XR was initiated at 75 mg daily for depression. She was continued on Norvasc for hypertension and Minipress for nightmares related to PTSD. She tolerated the Effexor well without adverse reaction or noticeable side effects. Her mood improved quickly during hospitalization. She actively participated in group therapy sessions on the unit. Her mood is euthymic with bright affect by time of discharge. Crisis plan was discussed at length. Patient plans to use coping skills learned from DBT groups to help manage her emotions. She plans to use relaxation, deep breathing and exercise and other things to distract her when she is feeling overwhelmed. She has talked to her boyfriend who has promised to seek treatment for his alcoholism as this appears to be a stressor for her. She says she will contact 911 should she feel suicidal and has no one to talk to at the time. She also plans to be more consistent with outpatient treatment. No SI, HI or psychosis by time of discharge. Patient was not a behavioral problem on the unit. She did not require seclusion or restraint. Patient is deemed to be at a low acute risk of suicide at this time. She denies suicidal ideation. Her mood is euthymic with bright affect. She is actively engaged in treatment and looking forward to continuing. She is looking forward to returning to work and continuing her online schooling. She is future oriented. She has a support system. She denies access to weapons. Risk factors include prior suicide attempts and diagnosis of MDD.      DISPOSITION/FOLLOW-UP     Disposition: Home    Follow-up Appointments: Follow-up Information     Follow up With Specialties Details Why Contact Info    None    None ([phone]) Patient stated that they have no PCP          Pt will return to outpt & Med Management with:     Isauro Hernadez [facility] # [phone]                                    [facility] [id number]                                     [place] [address]   ON: 1/6/2021 12:45pm  Support: AA Meetings            MEDICATION CHANGES   Outpatient medications:  No current facility-administered medications on file prior to encounter. Current Outpatient Medications on File Prior to Encounter   Medication Sig Dispense Refill    amLODIPine (NORVASC) 5 mg tablet TK 1 T PO QD  5    ferrous sulfate 325 mg (65 mg iron) tablet TK 1 T PO 1 TIME A DAY  5           Discharged medication:  Discharge Medication List as of 12/18/2020  1:38 PM      START taking these medications    Details   venlafaxine-SR (EFFEXOR-XR) 75 mg capsule Take 1 Cap by mouth daily. Indications: major depressive disorder, posttraumatic stress syndrome, Print, Disp-30 Cap, R-0         CONTINUE these medications which have CHANGED    Details   prazosin (MINIPRESS) 2 mg capsule Take 1 Cap by mouth nightly.  Indications: high blood pressure, posttraumatic stress syndrome, Print, Disp-30 Cap, R-0         CONTINUE these medications which have NOT CHANGED    Details   amLODIPine (NORVASC) 5 mg tablet TK 1 T PO QD, Historical Med, R-5      ferrous sulfate 325 mg (65 mg iron) tablet TK 1 T PO 1 TIME A DAY, Historical Med, R-5         STOP taking these medications       ABILIFY MAINTENA 400 mg injection Comments:   Reason for Stopping:         ARIPiprazole (ABILIFY) 10 mg tablet Comments:   Reason for Stopping:         benztropine (COGENTIN) 0.5 mg tablet Comments:   Reason for Stopping:         busPIRone (BUSPAR) 10 mg tablet Comments:   Reason for Stopping:         haloperidol (HALDOL) 5 mg tablet Comments:   Reason for Stopping:         hydroCHLOROthiazide (MICROZIDE) 12.5 mg capsule Comments:   Reason for Stopping:         sertraline (ZOLOFT) 100 mg tablet Comments:   Reason for Stopping:               Instructions, risks (black box warning), benefits and side effects (EPS, TD, NMS) were discussed in detail prior to discharge. Patient denied any adverse medication side effects prior to discharge. LABS/IMAGING DURING ADMISSION     Results for orders placed or performed during the hospital encounter of 12/13/20   CBC WITH AUTOMATED DIFF   Result Value Ref Range    WBC 17.4 (H) 4.6 - 13.2 K/uL    RBC 5.22 4.20 - 5.30 M/uL    HGB 12.1 12.0 - 16.0 g/dL    HCT 36.3 35.0 - 45.0 %    MCV 69.5 (L) 74.0 - 97.0 FL    MCH 23.2 (L) 24.0 - 34.0 PG    MCHC 33.3 31.0 - 37.0 g/dL    RDW 14.5 11.6 - 14.5 %    PLATELET 377 612 - 839 K/uL    MPV 10.0 9.2 - 11.8 FL    NEUTROPHILS 86 (H) 40 - 73 %    LYMPHOCYTES 11 (L) 21 - 52 %    MONOCYTES 3 3 - 10 %    EOSINOPHILS 0 0 - 5 %    BASOPHILS 0 0 - 2 %    ABS. NEUTROPHILS 14.9 (H) 1.8 - 8.0 K/UL    ABS. LYMPHOCYTES 1.9 0.9 - 3.6 K/UL    ABS. MONOCYTES 0.4 0.05 - 1.2 K/UL    ABS. EOSINOPHILS 0.1 0.0 - 0.4 K/UL    ABS. BASOPHILS 0.0 0.0 - 0.1 K/UL    DF AUTOMATED     METABOLIC PANEL, COMPREHENSIVE   Result Value Ref Range    Sodium 138 136 - 145 mmol/L    Potassium 3.4 (L) 3.5 - 5.5 mmol/L    Chloride 106 100 - 111 mmol/L    CO2 22 21 - 32 mmol/L    Anion gap 10 3.0 - 18 mmol/L    Glucose 106 (H) 74 - 99 mg/dL    BUN 15 7.0 - 18 MG/DL    Creatinine 0.84 0.6 - 1.3 MG/DL    BUN/Creatinine ratio 18 12 - 20      GFR est AA >60 >60 ml/min/1.73m2    GFR est non-AA >60 >60 ml/min/1.73m2    Calcium 8.8 8.5 - 10.1 MG/DL    Bilirubin, total <0.1 (L) 0.2 - 1.0 MG/DL    ALT (SGPT) 18 13 - 56 U/L    AST (SGOT) 23 10 - 38 U/L    Alk.  phosphatase 56 45 - 117 U/L    Protein, total 7.8 6.4 - 8.2 g/dL Albumin 3.8 3.4 - 5.0 g/dL    Globulin 4.0 2.0 - 4.0 g/dL    A-G Ratio 1.0 0.8 - 1.7     ETHYL ALCOHOL   Result Value Ref Range    ALCOHOL(ETHYL),SERUM 75 (H) 0 - 3 MG/DL   HCG QL SERUM   Result Value Ref Range    HCG, Ql. Negative NEG     SALICYLATE   Result Value Ref Range    Salicylate level <1.1 (L) 2.8 - 20.0 MG/DL   ACETAMINOPHEN   Result Value Ref Range    Acetaminophen level <2 (L) 10.0 - 30.0 ug/mL   DRUG SCREEN, URINE   Result Value Ref Range    BENZODIAZEPINES Negative NEG      BARBITURATES Negative NEG      THC (TH-CANNABINOL) Negative NEG      OPIATES Negative NEG      PCP(PHENCYCLIDINE) Negative NEG      COCAINE Negative NEG      AMPHETAMINES Negative NEG      METHADONE Negative NEG      HDSCOM (NOTE)    URINALYSIS W/ RFLX MICROSCOPIC   Result Value Ref Range    Color YELLOW      Appearance CLEAR      Specific gravity 1.010 1.005 - 1.030      pH (UA) 5.0 5.0 - 8.0      Protein Negative NEG mg/dL    Glucose Negative NEG mg/dL    Ketone Negative NEG mg/dL    Bilirubin Negative NEG      Blood LARGE (A) NEG      Urobilinogen 0.2 0.2 - 1.0 EU/dL    Nitrites Negative NEG      Leukocyte Esterase Negative NEG     SARS-COV-2   Result Value Ref Range    SARS-CoV-2 Not Detected Not Detected      Specimen source NP SWAB     COVID-19 rapid test Not detected NOTD      Specimen type NP Swab     URINE MICROSCOPIC ONLY   Result Value Ref Range    WBC 0 to 3 0 - 5 /hpf    RBC 0 to 3 0 - 5 /hpf    Epithelial cells 1+ 0 - 5 /lpf    Bacteria 1+ (A) NEG /hpf   EKG, 12 LEAD, INITIAL   Result Value Ref Range    Ventricular Rate 82 BPM    Atrial Rate 82 BPM    P-R Interval 170 ms    QRS Duration 90 ms    Q-T Interval 376 ms    QTC Calculation (Bezet) 439 ms    Calculated P Axis 34 degrees    Calculated R Axis 51 degrees    Calculated T Axis 46 degrees    Diagnosis       Normal sinus rhythm  Normal ECG  No previous ECGs available  Confirmed by Alana Porter MD. ([id number]) on 12/15/2020 12:33:52 AM          DISCHARGE MENTAL STATUS EVALUATION     Appearance/Hygiene 35 y.o. WHITE OR  female  Hygiene: Good   Attitude/Behavior/Social Relatedness Appropriate   Musculoskeletal Gait/Station: appropriate  Tone (flaccid, cogwheeling, spastic): not assessed  Psychomotor (hyperkinetic, hypokinetic): calm  Involuntary movements (tics, dyskinesias, akathisa, stereotypies): none   Speech   Rate, rhythm, volume, fluency and articulation are appropriate   Mood   euthymic   Affect    congruent   Thought Process Linear and goal directed   Thought Content and Perceptual Disturbances Denies self-injurious behavior (SIB), suicidal ideation (SI), aggressive behavior or homicidal ideation (HI)    Denies auditory and visual hallucinations   Sensorium and Cognition  Grossly intact   Insight  fair   Judgment  fair       SUICIDE RISK ASSESSMENT     [x] Admission  [x] Discharge     Key Factors:   Current admission precipitated by suicide attempt?   [x]  Yes     2    []  No     1     Suicide Attempt History  [] Past attempts of high lethality    2 [x]  Past attempts of low lethality    1 []  No previous attempts       0   Suicidal Ideation []  Constant suicidal thoughts      2 []  Intermittent or fleeting suicidal  thoughts  1 [x]  Denies current suicidal thoughts    0   Suicide Plan   []  Has plan with actual OR potential access to planned method    2 []  Has plan without access to planned method      1 [x]  No plan            0   Plan Lethality []  Highly lethal plan (Carbon monoxide, gun, hanging, jumping)    2 []  Moderate lethality of plan          1 []  Low lethality of plan (biting, head banging, superficial scratching, pillow over face)  0   Safety Plan Agreement  []  Unwilling OR unable to agree due to impaired reality testing   2   []  Patient is ambivalent and/or guarded      1 [x]  Reliably agrees        0   Current Morbid Thoughts (reunion fantasies, preoccupations with death) []  Constantly     2     []  Frequently    1 [x]  Rarely    0   Elopement Risk  []  High risk     2 []  Moderate risk    1 [x]   Low risk    0   Symptoms    []  Hopeless  []  Helpless  []  Anhedonia   []  Guilt/shame  []  Anger/rage  []  Anxiety  []  Insomnia   []  Agitation   []  Impulsivity  []  5-6 symptoms present    2 []  3-4 symptoms present    1  [x]  0-2 symptoms present    0     Scoring Key:  10 or higher = Imminent Risk (consider 1:1)  4 - 9 = Moderate Risk (consider q 15 minute observation)Attended alcohol, tobacco, prescription and other drug psychoeducation group.   0 - 3 = Low Risk (consider q 30 minute observation)    Total Score: 3  ------------------------------------------------------------------------------------------------------------------  PLEASE ADDRESS THE FOLLOWING 5 ISSUES     Physician's Subjective Appraisal of Risk (check one):  []  Patient replies not trustworthy: several non-verbal cues. []  Patient replies questionable: trustworthy: at least 1 non-verbal cue. [x]  Patient replies appear trustworthy. Family History of Suicide? []  Yes  [x]  No    Protective measures (select all that apply):  [x]  Successful past responses to stress  []  Spiritual/Mandaeism beliefs  [x]  Capacity for reality testing  [x]  Positive therapeutic relationships  [x]  Social supports/connections  []  Positive coping skills  []  Frustration tolerance/optimism  []  Children or pets in the home  []  Sense of responsibility to family  [x]  Agrees to treatment plan and follow up    Others (list):    High Risk Diagnoses (select all that apply):  [x]  Depression/Bipolar Disorder  []  Dual Diagnosis  []  Cardiovascular Disease  []  Schizophrenia  []  Chronic Pain  []  Epilepsy  []  Cancer  []  Personality Disorder  []  HIV/AIDS  []  Multiple Sclerosis    Dangerousness Assessment (Suicide, homicide, property destruction. ..)    Risk Factors reviewed and risk assessed to be:  [] low  [] low-moderate  [x] moderate   [] moderate-high  [] high     Protection factors reviewed and risk assessed to be:  [] low  [x] low-moderate  [] moderate   [] moderate-high  [] high     Response to treatment and risk assessed to be:  [x] low  [] low-moderate  [] moderate   [] moderate-high  [] high     Support reviewed and risk assessed to be:  [x] low  [] low-moderate  [] moderate   [] moderate-high  [] high     Acceptance of Discharge and outpatient treatment reviewed and risk assessed to be:    [x] low  [] low-moderate  [] moderate   [] moderate-high  [] high   Overall risk assessed to be:  [x] low  [] low-moderate  [] moderate   [] moderate-high  [] high     Completion of discharge was greater than 30 minutes. Over 50% of today's discharge was geared towards counseling and coordination of care.           Aimee Salinas MD  Psychiatry  DR. [facility]

## 2020-12-18 NOTE — GROUP NOTE
IP  GROUP DOCUMENTATION INDIVIDUAL Group Therapy Note Date: 12/17/2020 Group Start Time: [address] [facility] Group End Time: 1945 Group Topic: Nursing [facility] 1 ADULT CHEM DEP Simi Villarreal RN 
 
[facility] GROUP DOCUMENTATION GROUP Group Therapy Note Attendees: 6 Attendance: Did not attend Additional Notes:  Patient asleep Nikita Perdomo RN

## 2020-12-18 NOTE — BSMART NOTE
ART THERAPY GROUP PROGRESS NOTE PATIENT SCHEDULED FOR GROUP AT: [id number] ATTENDANCE: Full PARTICIPATION LEVEL: Participates fully in the art process ATTENTION LEVEL : Able to focus on task FOCUS: Values SYMBOLIC & THEMATIC CONTENT AS NOTED IN IMAGERY: She presented with a bright affect and participated in group discussion. She was able to identify personal values and was focused on wanting to [facility] others happy. \" She was reminded that she can impact how others decide to feel, but remind herself that she can only \"make\" herself happy and not to forget to work on self-love.

## 2020-12-18 NOTE — SUICIDE SAFETY PLAN
SAFETY PLAN    A suicide Safety Plan is a document that supports someone when they are having thoughts of suicide. Warning Signs that indicate a suicidal crisis may be developing: What (situations, thoughts, feelings, body sensations, behaviors, etc.) do you experience that lets you know you are beginning to think about suicide? 1. hopelessness  2. Freezing/feeling numb  3. Giving up    Internal Coping Strategies:  What things can I do (relaxation techniques, hobbies, physical activities, etc.) to take my mind off my problems without contacting another person? 1. Look at [employer] of house? 2. Paint model figurines  3. Write poetry    People and social settings that provide distraction: Who can I call or where can I go to distract me? 1. Name: Bishop Rosas  Phone: [phone]    People whom I can ask for help: Who can I call when I need help - for example, friends, family, clergy, someone else? 1. Name: Sergio Rdz              2. Name: Mr. Grayson Stevens    3. Name: Mrs. Carolyn Lopez or [address] [place] [address] I can contact during a crisis: Who can I call for help - for example, my doctor, my psychiatrist, my psychologist, a mental health provider, a suicide hotline? 1. Clinician Name: Dr. Sergio Rdz       3. Suicide Prevention Lifeline: 2-530-987-TALK (2053)    4. [address] [place] [address] Emergency Services -  for example, [facility] suicide hotline, [facility] Hotline: 911      Emergency Services Address: [address], [employer], [address] [place] [address]      Emergency Services Phone:  [phone]    Making the environment safe: How can I make my environment (house/apartment/living space) safer? For example, can I remove guns, medications, and other items? 1. Tell my boyfriend to hide the medications.   2. Remove all alcohol from the house

## 2020-12-18 NOTE — BSMART NOTE
SOCIAL WORK GROUP THERAPY PROGRESS NOTE Group Time:  10:30pm 
 
Group Topic:  Coping Skills    C D Issues Group Participation:   
 
 
Pt moderately involved during group discussion but remained attentive. Looked at the \"Process of setting Goals\", the value of a \"daily\" /  \"weekly\" schedule as tool to build self esteem, sharpen decision making skills and help define one's reality. Admits needs better \"daily\" schedule. Reviewed strategies to keep a \"Journal\" for moods, cognitions, behavior & outcome, which she'll increase. Did handout on  x25 ways to be better in managing \"anxiety\". Also knows she needs to develop support system & find ways to distract herself.

## 2020-12-18 NOTE — PROGRESS NOTES
Problem: Suicide  Goal: *STG: Remains safe in hospital  Description: Patient to remain safe every day while hospitalized. Outcome: Progressing Towards Goal  Goal: *STG: Attends activities and groups  Description: Patient to attend 2-3 every day while hospitalized. Outcome: Progressing Towards Goal     Problem: Depressed Mood (Adult/Pediatric)  Goal: *STG: Participates in treatment plan  Description: Patient to participate in treatment every day while hospitalized. Outcome: Progressing Towards Goal  Goal: *STG: Complies with medication therapy  Description: Patient to take medications as prescribed every day while hospitalized. Outcome: Progressing Towards Goal   Bravo Gu is a 35 y.o. Female that presented today as shows no evidence of impairment. Patient has been active with groups, has been Positive for medications, has eaten % of meals, and has been follows one step commands/direction staff direction. RN's will initiate, develop, implement, review, and revise treatment plans as necessary. Will continue to provide education, redirection, and support as needed or verbalized by patient.    Signed By: Shirley Marsh RN     December 18, 2020

## 2021-11-18 ENCOUNTER — HOSPITAL ENCOUNTER (OUTPATIENT)
Dept: NON INVASIVE DIAGNOSTICS | Age: 31
Discharge: HOME | End: 2021-11-18
Payer: MEDICAID

## 2021-11-18 ENCOUNTER — TRANSCRIBE ORDER (OUTPATIENT)
Dept: REGISTRATION | Age: 31
End: 2021-11-18

## 2021-11-18 DIAGNOSIS — R07.9 CHEST PAIN, UNSPECIFIED: ICD-10-CM

## 2021-11-18 DIAGNOSIS — R07.9 CHEST PAIN, UNSPECIFIED: Primary | ICD-10-CM

## 2021-11-18 LAB
ATRIAL RATE: 66 BPM
CALCULATED P AXIS, ECG09: 46 DEGREES
CALCULATED R AXIS, ECG10: 61 DEGREES
CALCULATED T AXIS, ECG11: 46 DEGREES
DIAGNOSIS, 93000: NORMAL
P-R INTERVAL, ECG05: 180 MS
Q-T INTERVAL, ECG07: 398 MS
QRS DURATION, ECG06: 80 MS
QTC CALCULATION (BEZET), ECG08: 417 MS
VENTRICULAR RATE, ECG03: 66 BPM

## 2021-11-18 PROCEDURE — 93005 ELECTROCARDIOGRAM TRACING: CPT
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
## 2022-04-03 ENCOUNTER — HOSPITAL ENCOUNTER (OUTPATIENT)
Age: 32
Discharge: HOME | End: 2022-04-03
Attending: OBSTETRICS & GYNECOLOGY | Admitting: OBSTETRICS & GYNECOLOGY
Payer: MEDICAID

## 2022-04-03 ENCOUNTER — HOSPITAL ENCOUNTER (EMERGENCY)
Age: 32
Discharge: HOME | End: 2022-04-03
Attending: EMERGENCY MEDICINE
Payer: MEDICAID

## 2022-04-03 ENCOUNTER — APPOINTMENT (OUTPATIENT)
Dept: GENERAL RADIOLOGY | Age: 32
End: 2022-04-03
Attending: EMERGENCY MEDICINE
Payer: MEDICAID

## 2022-04-03 VITALS
WEIGHT: 120 LBS | BODY MASS INDEX: 20.49 KG/M2 | HEART RATE: 75 BPM | OXYGEN SATURATION: 99 % | DIASTOLIC BLOOD PRESSURE: 83 MMHG | RESPIRATION RATE: 21 BRPM | TEMPERATURE: 97 F | HEIGHT: 64 IN | SYSTOLIC BLOOD PRESSURE: 132 MMHG

## 2022-04-03 VITALS — WEIGHT: 220 LBS | HEIGHT: 64 IN | BODY MASS INDEX: 37.56 KG/M2

## 2022-04-03 DIAGNOSIS — R10.84 ABDOMINAL PAIN, GENERALIZED: Primary | ICD-10-CM

## 2022-04-03 DIAGNOSIS — Z3A.31 31 WEEKS GESTATION OF PREGNANCY: ICD-10-CM

## 2022-04-03 DIAGNOSIS — B34.9 VIRAL SYNDROME: ICD-10-CM

## 2022-04-03 PROBLEM — Z34.90 PREGNANCY: Status: ACTIVE | Noted: 2022-04-03

## 2022-04-03 LAB
ALBUMIN SERPL-MCNC: 2.5 G/DL (ref 3.4–5)
ALBUMIN/GLOB SERPL: 0.6 {RATIO} (ref 0.8–1.7)
ALP SERPL-CCNC: 95 U/L (ref 45–117)
ALT SERPL-CCNC: 12 U/L (ref 13–56)
ANION GAP SERPL CALC-SCNC: 7 MMOL/L (ref 3–18)
AST SERPL-CCNC: 10 U/L (ref 10–38)
BASOPHILS # BLD: 0 K/UL (ref 0–0.1)
BASOPHILS NFR BLD: 0 % (ref 0–2)
BILIRUB SERPL-MCNC: 0.2 MG/DL (ref 0.2–1)
BUN SERPL-MCNC: 11 MG/DL (ref 7–18)
BUN/CREAT SERPL: 18 (ref 12–20)
CALCIUM SERPL-MCNC: 9.2 MG/DL (ref 8.5–10.1)
CHLORIDE SERPL-SCNC: 106 MMOL/L (ref 100–111)
CO2 SERPL-SCNC: 24 MMOL/L (ref 21–32)
CREAT SERPL-MCNC: 0.6 MG/DL (ref 0.6–1.3)
D DIMER PPP FEU-MCNC: 0.52 UG/ML(FEU)
DIFFERENTIAL METHOD BLD: ABNORMAL
EOSINOPHIL # BLD: 0.1 K/UL (ref 0–0.4)
EOSINOPHIL NFR BLD: 1 % (ref 0–5)
ERYTHROCYTE [DISTWIDTH] IN BLOOD BY AUTOMATED COUNT: 15.9 % (ref 11.6–14.5)
FIBRONECTIN FETAL VAG QL: NEGATIVE
FLUAV AG NPH QL IA: NEGATIVE
FLUBV AG NOSE QL IA: NEGATIVE
GLOBULIN SER CALC-MCNC: 4.2 G/DL (ref 2–4)
GLUCOSE SERPL-MCNC: 84 MG/DL (ref 74–99)
HCT VFR BLD AUTO: 35.4 % (ref 35–45)
HGB BLD-MCNC: 11.2 G/DL (ref 12–16)
IMM GRANULOCYTES # BLD AUTO: 0.1 K/UL (ref 0–0.04)
IMM GRANULOCYTES NFR BLD AUTO: 0 % (ref 0–0.5)
LYMPHOCYTES # BLD: 2 K/UL (ref 0.9–3.6)
LYMPHOCYTES NFR BLD: 18 % (ref 21–52)
MCH RBC QN AUTO: 21.9 PG (ref 24–34)
MCHC RBC AUTO-ENTMCNC: 31.6 G/DL (ref 31–37)
MCV RBC AUTO: 69.1 FL (ref 78–100)
MONOCYTES # BLD: 1 K/UL (ref 0.05–1.2)
MONOCYTES NFR BLD: 9 % (ref 3–10)
NEUTS SEG # BLD: 8.3 K/UL (ref 1.8–8)
NEUTS SEG NFR BLD: 72 % (ref 40–73)
NRBC # BLD: 0 K/UL (ref 0–0.01)
NRBC BLD-RTO: 0 PER 100 WBC
PLATELET # BLD AUTO: 332 K/UL (ref 135–420)
PMV BLD AUTO: 9.5 FL (ref 9.2–11.8)
POTASSIUM SERPL-SCNC: 4.2 MMOL/L (ref 3.5–5.5)
PROT SERPL-MCNC: 6.7 G/DL (ref 6.4–8.2)
RBC # BLD AUTO: 5.12 M/UL (ref 4.2–5.3)
SODIUM SERPL-SCNC: 137 MMOL/L (ref 136–145)
WBC # BLD AUTO: 11.5 K/UL (ref 4.6–13.2)

## 2022-04-03 PROCEDURE — 99284 EMERGENCY DEPT VISIT MOD MDM: CPT

## 2022-04-03 PROCEDURE — 85379 FIBRIN DEGRADATION QUANT: CPT

## 2022-04-03 PROCEDURE — 82731 ASSAY OF FETAL FIBRONECTIN: CPT

## 2022-04-03 PROCEDURE — 87804 INFLUENZA ASSAY W/OPTIC: CPT

## 2022-04-03 PROCEDURE — 74011250637 HC RX REV CODE- 250/637: Performed by: MIDWIFE

## 2022-04-03 PROCEDURE — 71045 X-RAY EXAM CHEST 1 VIEW: CPT

## 2022-04-03 PROCEDURE — 59025 FETAL NON-STRESS TEST: CPT

## 2022-04-03 PROCEDURE — 80053 COMPREHEN METABOLIC PANEL: CPT

## 2022-04-03 PROCEDURE — 85025 COMPLETE CBC W/AUTO DIFF WBC: CPT

## 2022-04-03 RX ORDER — LABETALOL 100 MG/1
200 TABLET, FILM COATED ORAL 2 TIMES DAILY
COMMUNITY

## 2022-04-03 RX ORDER — VITAMIN A ACETATE, BETA CAROTENE, ASCORBIC ACID, CHOLECALCIFEROL, .ALPHA.-TOCOPHEROL ACETATE, DL-, THIAMINE MONONITRATE, RIBOFLAVIN, NIACINAMIDE, PYRIDOXINE HYDROCHLORIDE, FOLIC ACID, CYANOCOBALAMIN, CALCIUM CARBONATE, FERROUS FUMARATE, ZINC OXIDE, CUPRIC OXIDE 3080; 12; 120; 400; 1; 1.84; 3; 20; 22; 920; 25; 200; 27; 10; 2 [IU]/1; UG/1; MG/1; [IU]/1; MG/1; MG/1; MG/1; MG/1; MG/1; [IU]/1; MG/1; MG/1; MG/1; MG/1; MG/1
1 TABLET, FILM COATED ORAL DAILY
COMMUNITY

## 2022-04-03 RX ORDER — ACETAMINOPHEN 500 MG
1000 TABLET ORAL
Status: DISCONTINUED | OUTPATIENT
Start: 2022-04-03 | End: 2022-04-03 | Stop reason: HOSPADM

## 2022-04-03 RX ADMIN — ACETAMINOPHEN 1000 MG: 500 TABLET ORAL at 15:28

## 2022-04-03 NOTE — ED PROVIDER NOTES
20-year-old female past medical history of hypertension schizophrenia self-mutilation social anxiety disorder and depression presents to the emergency department because she has cold-like symptoms. Patient states for the past few days she has had body aches runny nose cough and shortness of breath. She called the nursing line helpline and sent to the emergency department. She has no fevers or chills no nausea vomiting she does have abdominal discomfort. No vaginal discharge no burning with urination. Past Medical History:   Diagnosis Date    Depression     Hypertension     Iron deficiency anemia     Schizoaffective disorder, bipolar type ([address]     Self-mutilation     Social anxiety disorder        Past Surgical History:   Procedure Laterality Date    HX OTHER SURGICAL      right shoulder surgery. History reviewed. No pertinent family history. Social History     Socioeconomic History    Marital status: SINGLE     Spouse name: Not on file    Number of children: Not on file    Years of education: Not on file    Highest education level: Not on file   Occupational History    Not on file   Tobacco Use    Smoking status: Never Smoker    Smokeless tobacco: Never Used   Substance and Sexual Activity    Alcohol use: No    Drug use: Not Currently     Frequency: 1.0 times per week     Types: Marijuana     Comment: has not smoked in over 8 months    Sexual activity: Not Currently   Other Topics Concern    Not on file   Social History Narrative    Not on file     Social Determinants of Health     Financial Resource Strain:     Difficulty of Paying Living Expenses: Not on file   Food Insecurity:     Worried About Running Out of Food in the Last Year: Not on file    Lacy of Food in the Last Year: Not on file   Transportation Needs:     Lack of Transportation (Medical): Not on file    Lack of Transportation (Non-Medical):  Not on file   Physical Activity:     Days of Exercise per Week: Not on file    Minutes of Exercise per Session: Not on file   Stress:     Feeling of Stress : Not on file   Social Connections:     Frequency of Communication with Friends and Family: Not on file    Frequency of Social Gatherings with Friends and Family: Not on file    Attends Confucianism Services: Not on file    Active Member of [address] [place] [address] or Organizations: Not on file    Attends Club or Organization Meetings: Not on file    Marital Status: Not on file   Intimate Partner Violence:     Fear of Current or Ex-Partner: Not on file    Emotionally Abused: Not on file    Physically Abused: Not on file    Sexually Abused: Not on file   Housing Stability:     Unable to Pay for Housing in the Last Year: Not on file    Number of [place] in the Last Year: Not on file    Unstable Housing in the Last Year: Not on file         ALLERGIES: Patient has no known allergies. Review of Systems   Constitutional: Negative. HENT: Negative. Respiratory: Positive for cough and shortness of breath. Cardiovascular: Negative. Gastrointestinal: Negative. Genitourinary: Negative. Musculoskeletal: Negative. Neurological: Negative. Hematological: Negative. All other systems reviewed and are negative. Vitals:    04/03/22 1211 04/03/22 1220   BP: (!) 141/86 (!) 151/98   Pulse: 81 74   Resp: 18 19   Temp: 97 °F (36.1 °C)    SpO2: 100% 100%   Weight: 54.4 kg (120 lb)    Height: 5' 4\" (1.626 m)             Physical Exam  Vitals and nursing note reviewed. Constitutional:       General: She is not in acute distress. Appearance: She is well-developed. She is not ill-appearing, toxic-appearing or diaphoretic. Interventions: She is not intubated. HENT:      Head: Normocephalic and atraumatic. Eyes:      Extraocular Movements: Extraocular movements intact. Pupils: Pupils are equal, round, and reactive to light. Neck:      Thyroid: No thyromegaly.       Vascular: No hepatojugular reflux or JVD. Trachea: No tracheal deviation. Cardiovascular:      Rate and Rhythm: Normal rate and regular rhythm. Pulmonary:      Effort: Pulmonary effort is normal. No tachypnea, bradypnea, accessory muscle usage or respiratory distress. She is not intubated. Breath sounds: Normal breath sounds. No stridor. No decreased breath sounds, wheezing, rhonchi or rales. Chest:      Chest wall: No mass, deformity, tenderness, crepitus or edema. There is no dullness to percussion. Abdominal:      Palpations: Abdomen is soft. There is no hepatomegaly, splenomegaly or mass. Tenderness: There is no abdominal tenderness. There is no guarding or rebound. Musculoskeletal:         General: Normal range of motion. Cervical back: Neck supple. Lymphadenopathy:      Cervical: No cervical adenopathy. Skin:     General: Skin is warm. Capillary Refill: Capillary refill takes less than 2 seconds. Coloration: Skin is not cyanotic or pale. Findings: No ecchymosis, erythema or rash. Nails: There is no clubbing. Neurological:      General: No focal deficit present. Mental Status: She is alert and oriented to person, place, and time. Psychiatric:         Mood and Affect: Mood normal. Mood is not anxious. Behavior: Behavior normal. Behavior is not agitated. MDM  Number of Diagnoses or Management Options  31 weeks gestation of pregnancy  Abdominal pain, generalized  Viral syndrome  Diagnosis management comments: Patient does not have PE chest x-ray unremarkable flu is negative will discharge patient and have her go upstairs if she feels she has abdominal discomfort.          Procedures

## 2022-04-03 NOTE — PROGRESS NOTES
1435- Patient presents to labor and delivery from the ER  31weeks 2days for abdominal pain that started yesterday, denies vaginal bleeding, states no unusual discharge, states no recent sex, states she last ate this morning and her cold symptoms started @ a week ago, patient takes labetalol for chronic HTN. Triage assessment performed, TOCO and ultrasound applied to abdomin, abdomin soft to palpate. 1505- Bedside and Verbal shift change report given to [facility] (oncoming nurse) by LI López RN  (offgoing nurse). Report included the following information SBAR, Kardex and MAR.

## 2022-04-03 NOTE — ED TRIAGE NOTES
I have flu symptoms for about a week. Cough, shortness of breath, body aches, sinus pressure. Nasal congestion.  Patient reports she is 31 weeks pregnant

## 2022-04-03 NOTE — DISCHARGE INSTRUCTIONS
Patient Education        Learning About Pregnancy  Your Care Instructions     Your health in the early weeks of your pregnancy is particularly important for your baby's health. Take good care of yourself. Anything you do that harms your body can also harm your baby. Make sure to go to all of your doctor appointments. Regular checkups will help keep you and your baby healthy. How can you care for yourself at home? Diet    · Eat a balanced diet. Make sure your diet includes plenty of beans, peas, and leafy green vegetables.     · Do not skip meals or go for many hours without eating. If you are nauseated, try to eat a small, healthy snack every 2 to 3 hours.     · Do not eat fish that has a high level of mercury, such as shark, swordfish, or mackerel. Do not eat more than one can of tuna each week.     · Drink plenty of fluids. If you have kidney, heart, or liver disease and have to limit fluids, talk with your doctor before you increase the amount of fluids you drink.     · Cut down on caffeine, such as coffee, tea, and cola.     · Do not drink alcohol, such as beer, wine, or hard liquor.     · Take a multivitamin that contains at least 400 micrograms (mcg) of folic acid to help prevent birth defects. Fortified cereal and whole wheat bread are good additional sources of folic acid.     · Increase the calcium in your diet. Try to drink a quart of skim milk each day. You may also take calcium supplements and choose foods such as cheese and yogurt. Lifestyle    · Make sure you go to your follow-up appointments.     · Get plenty of rest. You may be unusually tired while you are pregnant.     · Get at least 30 minutes of exercise on most days of the week. Walking is a good choice. If you have not exercised in the past, start out slowly. Take several short walks each day.     · Do not smoke. If you need help quitting, talk to your doctor about stop-smoking programs.  These can increase your chances of quitting for good.     · Do not touch cat feces or litter boxes. Also, wash your hands after you handle raw meat, and fully cook all meat before you eat it. Wear gloves when you work in the yard or garden, and wash your hands well when you are done. Cat feces, raw or undercooked meat, and contaminated dirt can cause an infection that may harm your baby or lead to a miscarriage.     · Avoid things that can make your body too hot and may be harmful to your baby, such as a hot tub or sauna. Or talk with your doctor before doing anything that raises your body temperature. Your doctor can tell you if it's safe.     · Avoid chemical fumes, paint fumes, or poisons.     · Do not use illegal drugs, marijuana, or alcohol. Medicines    · Review all of your medicines with your doctor. Some of your routine medicines may need to be changed to protect your baby.     · Use acetaminophen (Tylenol) to relieve minor problems, such as a mild headache or backache or a mild fever with cold symptoms. Do not use nonsteroidal anti-inflammatory drugs (NSAIDs), such as ibuprofen (Advil, Motrin) or naproxen (Aleve), unless your doctor says it is okay.     · Do not take two or more pain medicines at the same time unless the doctor told you to. Many pain medicines have acetaminophen, which is Tylenol. Too much acetaminophen (Tylenol) can be harmful.     · Take your medicines exactly as prescribed. Call your doctor if you think you are having a problem with your medicine. To manage morning sickness    · If you feel sick when you first wake up, try eating a small snack (such as crackers) before you get out of bed. Allow some time to digest the snack, and then get out of bed slowly.     · Do not skip meals or go for long periods without eating.  An empty stomach can make nausea worse.     · Eat small, frequent meals instead of three large meals each day.     · Drink plenty of fluids.     · Eat foods that are high in protein but low in fat.     · If you are taking iron supplements, ask your doctor if they are necessary. Iron can make nausea worse.     · Avoid any smells, such as coffee, that make you feel sick.     · Get lots of rest. Morning sickness may be worse when you are tired. Follow-up care is a key part of your treatment and safety. Be sure to make and go to all appointments, and call your doctor if you are having problems. It's also a good idea to know your test results and keep a list of the medicines you take. Where can you learn more? Go to http://www.gray.com/  Enter R879 in the search box to learn more about \"Learning About Pregnancy. \"  Current as of: June 16, 2021               Content Version: 13.2  © 2006-2022 [employer]. Care instructions adapted under license by [employer] (which disclaims liability or warranty for this information). If you have questions about a medical condition or this instruction, always ask your healthcare professional. [address] any warranty or liability for your use of this information.

## 2022-04-24 ENCOUNTER — HOSPITAL ENCOUNTER (OUTPATIENT)
Age: 32
Setting detail: OBSERVATION
Discharge: LEFT BEFORE DISCHARGE | End: 2022-04-26
Attending: OBSTETRICS & GYNECOLOGY | Admitting: OBSTETRICS & GYNECOLOGY
Payer: COMMERCIAL

## 2022-04-24 ENCOUNTER — HOSPITAL ENCOUNTER (EMERGENCY)
Age: 32
Discharge: OTHER INSTITUTION | End: 2022-04-24

## 2022-04-24 VITALS
BODY MASS INDEX: 39.61 KG/M2 | HEIGHT: 64 IN | OXYGEN SATURATION: 97 % | WEIGHT: 232 LBS | DIASTOLIC BLOOD PRESSURE: 93 MMHG | SYSTOLIC BLOOD PRESSURE: 145 MMHG | TEMPERATURE: 97.3 F | RESPIRATION RATE: 18 BRPM | HEART RATE: 73 BPM

## 2022-04-24 PROBLEM — Z3A.34 34 WEEKS GESTATION OF PREGNANCY: Status: ACTIVE | Noted: 2022-04-24

## 2022-04-24 PROBLEM — T14.91XA SUICIDAL BEHAVIOR WITH ATTEMPTED SELF-INJURY (HCC): Status: ACTIVE | Noted: 2022-04-24

## 2022-04-24 PROCEDURE — 96360 HYDRATION IV INFUSION INIT: CPT

## 2022-04-24 PROCEDURE — G0378 HOSPITAL OBSERVATION PER HR: HCPCS

## 2022-04-24 PROCEDURE — 99285 EMERGENCY DEPT VISIT HI MDM: CPT

## 2022-04-24 PROCEDURE — 65270000029 HC RM PRIVATE

## 2022-04-24 PROCEDURE — 74011250636 HC RX REV CODE- 250/636

## 2022-04-24 RX ORDER — SERTRALINE HYDROCHLORIDE 50 MG/1
50 TABLET, FILM COATED ORAL 2 TIMES DAILY
COMMUNITY

## 2022-04-24 RX ORDER — ZOLPIDEM TARTRATE 5 MG/1
5 TABLET ORAL
Status: DISCONTINUED | OUTPATIENT
Start: 2022-04-24 | End: 2022-04-26 | Stop reason: HOSPADM

## 2022-04-24 RX ORDER — FAMOTIDINE 10 MG/ML
INJECTION INTRAVENOUS
Status: DISPENSED
Start: 2022-04-24 | End: 2022-04-25

## 2022-04-24 RX ORDER — SODIUM CHLORIDE 0.9 % (FLUSH) 0.9 %
5-40 SYRINGE (ML) INJECTION EVERY 8 HOURS
Status: DISCONTINUED | OUTPATIENT
Start: 2022-04-24 | End: 2022-04-26 | Stop reason: HOSPADM

## 2022-04-24 RX ORDER — DIPHENHYDRAMINE HYDROCHLORIDE 50 MG/ML
INJECTION, SOLUTION INTRAMUSCULAR; INTRAVENOUS
Status: DISPENSED
Start: 2022-04-24 | End: 2022-04-25

## 2022-04-24 RX ORDER — DIPHENHYDRAMINE HCL 25 MG
25 CAPSULE ORAL
Status: DISCONTINUED | OUTPATIENT
Start: 2022-04-24 | End: 2022-04-26 | Stop reason: HOSPADM

## 2022-04-24 RX ORDER — SODIUM CHLORIDE 0.9 % (FLUSH) 0.9 %
5-40 SYRINGE (ML) INJECTION AS NEEDED
Status: DISCONTINUED | OUTPATIENT
Start: 2022-04-24 | End: 2022-04-26 | Stop reason: HOSPADM

## 2022-04-24 RX ORDER — MAG HYDROX/ALUMINUM HYD/SIMETH 200-200-20
30 SUSPENSION, ORAL (FINAL DOSE FORM) ORAL
Status: DISCONTINUED | OUTPATIENT
Start: 2022-04-24 | End: 2022-04-26 | Stop reason: HOSPADM

## 2022-04-24 RX ORDER — ACETAMINOPHEN 500 MG
1000 TABLET ORAL
Status: DISCONTINUED | OUTPATIENT
Start: 2022-04-24 | End: 2022-04-26 | Stop reason: HOSPADM

## 2022-04-24 RX ADMIN — SODIUM CHLORIDE, POTASSIUM CHLORIDE, SODIUM LACTATE AND CALCIUM CHLORIDE 1000 ML: 600; 310; 30; 20 INJECTION, SOLUTION INTRAVENOUS at 15:57

## 2022-04-24 NOTE — ED NOTES
After speaking with nursing supervisor and L&D charge RN, pt chart was reviewed. Consulted with case management to ensure safe disposition and plan of care for patient.

## 2022-04-24 NOTE — ED TRIAGE NOTES
Ambulatory patient arrives after choking self with extension cord for unknown amount of time, denies losing consciousness, 34weeks pregnant, denies suicidal intentions currently
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
## 2022-04-24 NOTE — PROGRESS NOTES
1430:  31y. o. pt at 34w2d arrived to L&D unit from the ER following a suicide attempt at home today. Pt is to receive an NST at this time. EFM and Murray applied. Pt reports positive fetal movement. Pt denies contractions, contractions noted upon palpation. Pt denies pain, headache, blurred vision, or RUQ pain. 1920: Bedside and Verbal shift change report given to JAYDEN Barksdale RN (oncoming nurse) by CELESTE Alba RN (offgoing nurse). Report included the following information SBAR, Procedure Summary, Intake/Output, MAR and Recent Results.

## 2022-04-25 PROCEDURE — G0378 HOSPITAL OBSERVATION PER HR: HCPCS

## 2022-04-25 PROCEDURE — 74011250637 HC RX REV CODE- 250/637

## 2022-04-25 PROCEDURE — 59025 FETAL NON-STRESS TEST: CPT

## 2022-04-25 PROCEDURE — 74011250637 HC RX REV CODE- 250/637: Performed by: OBSTETRICS & GYNECOLOGY

## 2022-04-25 PROCEDURE — 65270000029 HC RM PRIVATE

## 2022-04-25 RX ORDER — LABETALOL 200 MG/1
200 TABLET, FILM COATED ORAL 3 TIMES DAILY
Status: DISCONTINUED | OUTPATIENT
Start: 2022-04-25 | End: 2022-04-26 | Stop reason: HOSPADM

## 2022-04-25 RX ORDER — SERTRALINE HYDROCHLORIDE 50 MG/1
100 TABLET, FILM COATED ORAL DAILY
Status: DISCONTINUED | OUTPATIENT
Start: 2022-04-25 | End: 2022-04-26 | Stop reason: HOSPADM

## 2022-04-25 RX ADMIN — LABETALOL HYDROCHLORIDE 200 MG: 200 TABLET, FILM COATED ORAL at 20:56

## 2022-04-25 RX ADMIN — LABETALOL HYDROCHLORIDE 200 MG: 200 TABLET, FILM COATED ORAL at 10:23

## 2022-04-25 RX ADMIN — LABETALOL HYDROCHLORIDE 200 MG: 200 TABLET, FILM COATED ORAL at 16:21

## 2022-04-25 RX ADMIN — SERTRALINE 100 MG: 50 TABLET, FILM COATED ORAL at 10:23

## 2022-04-25 RX ADMIN — VITAMIN A, VITAMIN C, VITAMIN D, VITAMIN E, THIAMINE, RIBOFLAVIN, NIACIN, VITAMIN B6, FOLIC ACID, VITAMIN B12, CALCIUM, IRON, ZINC, COPPER 1 TABLET: 4000; 120; 400; 22; 1.84; 3; 20; 10; 1; 12; 200; 27; 25; 2 TABLET ORAL at 08:42
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
## 2022-04-25 NOTE — PROGRESS NOTES
0749 TRANSFER - IN REPORT:    Verbal report received from DARLENE Barksdale RN (name) on [facility]  being received from L&D (unit) for routine progression of care      Report consisted of patients Situation, Background, Assessment and   Recommendations(SBAR). Information from the following report(s) SBAR, Kardex, Procedure Summary, Intake/Output, MAR and Recent Results was reviewed with the receiving nurse. Opportunity for questions and clarification was provided. Assessment completed upon patients arrival to unit and care assumed. 2250 Assessment completed. See flowsheet. 0385 Patient asleep in bed. Sitter in room. 2563 Patient asleep in bed. Sitter in room. 5291 Verbal shift change report given to Khoa Adam RN (oncoming nurse) by Juan Miguel Pascual RN (offgoing nurse). Report included the following information SBAR, Kardex, Procedure Summary, Intake/Output, MAR and Recent Results.

## 2022-04-25 NOTE — PROGRESS NOTES
Ante Partum Progress Note    Norberto Matute  [id number]    Assessment: 34w3d       Plan:  Continue hospitalization with psych consult pending. Pt is on zoloft 50 mg at home will increase to 100 mg and await recommendations from psych for further medication, pt has a counselor and will call to set up appointment today. Also with chronic hptn on labetolol 200 tid not on medications in hospital will start. . has appointmetn in office tomorrow if she does go home, states she has no suicidal ideation currently. Gives h/o psychizo affective disorder with multiple hospitalization in past        Patient states she has no new complaints, no longer feeling suicidal, baby actie    Vitals:  Visit Vitals  BP (!) 143/103 (BP 1 Location: Right upper arm, BP Patient Position: At rest;Sitting)   Pulse 68   Temp 97.6 °F (36.4 °C)   Resp 18   Ht 5' 4\" (1.626 m)   Wt 104.3 kg (230 lb)   SpO2 100%   BMI 39.48 kg/m²     Temp (24hrs), Av.8 °F (36.6 °C), Min:97.3 °F (36.3 °C), Max:98.7 °F (37.1 °C)      Last 24hr Input/Output:  No intake or output data in the 24 hours ending 22 0945     Uterine Activity: None     Exam:  Patient without distress. Abdomen, fundus soft non-tender     Extremities, no redness or tenderness               Additional Exam: Deferre      Labs: All lab results for the last 24 hours reviewed. No results found for this or any previous visit (from the past 24 hour(s)).   Kimberlee Rowland MD  2022  9:45 AM

## 2022-04-25 NOTE — PROGRESS NOTES
1905 Bedside shift change report given to Jason Washburn, RN (oncoming nurse) by Jimmie Pearl RN (offgoing nurse). Report included the following information SBAR, Intake/Output, MAR and Recent Results. 2005 Pt resting with call bell in reach. 2043 Pt. AAOx4. Pain 3/10 but declines pain meds at this time. Pt reports having intermittent contractions, but denies vaginal leakage or bleeding. Pt reports fetal movement. Educated on signs and symptoms to report and plan of care. No further questions or concerns at this time. Callbell within reach. Bed in lowest position. 2056 Pt administered 200 mg labetalol. 2320 Pt sleeping with call bell in reach. 0144 Pt bp reassessed per order of Paras Adorno CNM.    0200 This RN informed by CN that patient is wanting to leave. [facility] informed that pt wants to leave. Nursing supervisor said to call ED for 's number then to let CNM know to call to place a TDO/ECO. Darwin Shannon notified that pt wants to leave. 0255 Pt IV removed as requested. 9067  This RN in nursery with another infant when yelling was heard from the hallway. This RN notified that pt eloped from the unit. 0530 Nursing supervisor called and said pt has eloped and to discharge her from the system.

## 2022-04-25 NOTE — PROGRESS NOTES
Problem: Antepartum: Day 1 through Discharge  Goal: Activity/Safety  Outcome: Progressing Towards Goal  Goal: Consults, if ordered  Outcome: Progressing Towards Goal  Goal: Diagnostic Test/Procedures  Outcome: Progressing Towards Goal  Goal: Nutrition/Diet  Outcome: Progressing Towards Goal  Goal: Discharge Planning  Outcome: Progressing Towards Goal  Goal: Medications  Outcome: Progressing Towards Goal  Goal: Treatments/Interventions/Procedures  Outcome: Progressing Towards Goal  Goal: Psychosocial  Outcome: Progressing Towards Goal  Goal: *Vital signs within defined limits  Outcome: Progressing Towards Goal  Goal: *Labs within defined limits  Outcome: Progressing Towards Goal  Goal: *Hemodynamically stable  Outcome: Progressing Towards Goal  Goal: *Optimal pain control at patient's stated goal  Outcome: Progressing Towards Goal  Goal: *Tolerating diet  Outcome: Progressing Towards Goal  Goal: *Performs self perineal care  Outcome: Progressing Towards Goal  Goal: *Reassuring fetal surveillance  Outcome: Progressing Towards Goal  Goal: *Able to cope  Outcome: Progressing Towards Goal  Goal: *Absence of injury  Outcome: Progressing Towards Goal  Goal: *Free from active signs of labor  Outcome: Progressing Towards Goal

## 2022-04-25 NOTE — PROGRESS NOTES
4505 Verbal shift change report given to Ramona Engel RN (oncoming nurse) by Catalino Landeros RN (offgoing nurse). Report included the following information SBAR, Kardex, Procedure Summary, Intake/Output, MAR and Recent Results. 0730: Pt asleep in bed in NAD. Sitter at bedside. 5280: Prenatal vitamin administered    0845: Shift assessment completed. Pt complains of mild back discomfort, pt deferred PRN Tylenol. Pt sitting on edge of bed in NAD. Sitter at bedside. Pt reports fetal movements and denies loss of fluid or abd cramping. Pt reports no thoughts of wanting to hurt self. 0218: Pt up to shower at this time w/ sitter. 0935: Pt sitting edge of bed in NAD.     4099: Dr. Wisam Rose at bedside talking w/ pt. MD aware of pt's HTN.     1023: BP re-assessed. Labetalol and Zoloft administered. 1340: Pt sitting edge of bed eating lunch     1442:Pt transported to triage room 1 for NST via wheelchair. Sitter w/ pt.     1540: Pt transported back to room 244 via wheelchair. Sitter at bedisde    1545: Psychiatrist at bedside. 1605: Case management consult placed for inpatient psych per MD recommendation. Pt to go to treatment voluntarily. 1905: Bedside and Verbal shift change report given to Camden Lyons (oncoming nurse) by kinza Rosario RN (offgoing nurse). Report included the following information SBAR, Kardex, Procedure Summary, Intake/Output, MAR and Recent Results.

## 2022-04-26 VITALS
OXYGEN SATURATION: 98 % | HEART RATE: 71 BPM | RESPIRATION RATE: 20 BRPM | HEIGHT: 64 IN | SYSTOLIC BLOOD PRESSURE: 148 MMHG | TEMPERATURE: 98.2 F | DIASTOLIC BLOOD PRESSURE: 84 MMHG | WEIGHT: 230 LBS | BODY MASS INDEX: 39.27 KG/M2

## 2022-04-26 PROCEDURE — G0378 HOSPITAL OBSERVATION PER HR: HCPCS

## 2022-04-26 NOTE — PROGRESS NOTES
9083 This Nursing Supervisor received phone call from [employer]. He states that he completed a well check on [facility] as requested by our staff. He said that she is home. He let her know that the hospital is concerned and would like for her to return, he offered to bring her back but she refused at this time.

## 2022-04-26 NOTE — DISCHARGE SUMMARY
Ante Partum Progress Note    Norberto Matute  [id number]    Assessment: 34w4d   Patient left AMA prior to ECO my , had d/w [facility] emergency services for TDO     Plan: encouraged patient to stay for treatment but left AMA   Dr. Anderson Carrasquillo notified of disposition of patient        Patient states she does not have SI ideation at this time, is concerned about her studies as school ends . Vitals:  Visit Vitals  BP (!) 148/84 (BP 1 Location: Left upper arm, BP Patient Position: At rest;Lying)   Pulse 71   Temp 98.2 °F (36.8 °C)   Resp 20   Ht 5' 4\" (1.626 m)   Wt 104.3 kg (230 lb)   SpO2 98%   BMI 39.48 kg/m²     Temp (24hrs), Av °F (36.7 °C), Min:97.6 °F (36.4 °C), Max:98.2 °F (36.8 °C)      Last 24hr Input/Output:  No intake or output data in the 24 hours ending 22 0725         Uterine Activity: None     Exam:  Patient without distress. CERVIX; Cervical Exam  Membrane Status: Intact    Additional Exam: Deferred    Labs:     Lab Results   Component Value Date/Time    WBC 11.5 2022 12:56 PM    WBC 17.4 (H) 2020 11:11 PM    WBC 13.3 (H) 2020 11:15 PM    HGB 11.2 (L) 2022 12:56 PM    HGB 12.1 2020 11:11 PM    HGB 11.8 (L) 2020 11:15 PM    HCT 35.4 2022 12:56 PM    HCT 36.3 2020 11:11 PM    HCT 36.2 2020 11:15 PM    PLATELET 457  12:56 PM    PLATELET 390  11:11 PM    PLATELET 414  11:15 PM       No results found for this or any previous visit (from the past 24 hour(s)).

## 2022-04-26 NOTE — PROGRESS NOTES
1564 The sitter was relieved so the nurses' phone could be used for the psychiatric evaluation which had to be conducted over zoom. During the evaluation the patient became increasingly agitated. When told she would be admitted to a psychiatric hospital she argued, then refused, saying she could not miss school and this would ruin her financially. She gave the phone back and left the room. She was followed with the psychiatrist still speaking over the zoom call, informing her that if she left the police would be called. 2828 She left the unit. She was encouraged to have her boyfriend bring her school things to the hospital and complete her work there. I returned to the unit to ask another nurse to call security. [address] was called. The patient made it onto the elevator first; by the time I made to the ED reception area she had left the building. She could not be seen anywhere in the parking lot or adjacent roads. I returned to the unit and the nursing supervisor and on call provider were notified.
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
## 2023-01-27 ENCOUNTER — HOSPITAL ENCOUNTER (OUTPATIENT)
Dept: HOSPITAL 53 - M PLALAB | Age: 33
End: 2023-01-27
Attending: OBSTETRICS & GYNECOLOGY
Payer: COMMERCIAL

## 2023-01-27 DIAGNOSIS — O10.912: Primary | ICD-10-CM

## 2023-01-27 DIAGNOSIS — Z3A.24: ICD-10-CM

## 2023-01-27 LAB
HCT VFR BLD AUTO: 34.3 % (ref 36–47)
HGB BLD-MCNC: 10.4 G/DL (ref 12–15.5)
MCH RBC QN AUTO: 21.5 PG (ref 27–33)
MCHC RBC AUTO-ENTMCNC: 30.3 G/DL (ref 32–36.5)
MCV RBC AUTO: 70.9 FL (ref 80–96)
PLATELET # BLD AUTO: 385 10^3/UL (ref 150–450)
RBC # BLD AUTO: 4.84 10^6/UL (ref 4–5.4)
WBC # BLD AUTO: 13.2 10^3/UL (ref 4–10)

## 2023-03-15 ENCOUNTER — HOSPITAL ENCOUNTER (OUTPATIENT)
Dept: HOSPITAL 53 - M SFHCWAGY | Age: 33
End: 2023-03-15
Attending: OBSTETRICS & GYNECOLOGY
Payer: COMMERCIAL

## 2023-03-15 DIAGNOSIS — Z34.93: Primary | ICD-10-CM

## 2023-03-17 ENCOUNTER — HOSPITAL ENCOUNTER (OUTPATIENT)
Dept: HOSPITAL 53 - M WHC | Age: 33
End: 2023-03-17
Attending: OBSTETRICS & GYNECOLOGY
Payer: COMMERCIAL

## 2023-03-17 DIAGNOSIS — O10.013: Primary | ICD-10-CM

## 2023-03-26 ENCOUNTER — HOSPITAL ENCOUNTER (INPATIENT)
Dept: HOSPITAL 53 - M LDI | Age: 33
LOS: 2 days | Discharge: HOME | End: 2023-03-28
Attending: OBSTETRICS & GYNECOLOGY | Admitting: OBSTETRICS & GYNECOLOGY
Payer: COMMERCIAL

## 2023-03-26 VITALS — HEIGHT: 64 IN | BODY MASS INDEX: 41.55 KG/M2 | WEIGHT: 243.39 LBS

## 2023-03-26 VITALS — DIASTOLIC BLOOD PRESSURE: 82 MMHG | SYSTOLIC BLOOD PRESSURE: 134 MMHG

## 2023-03-26 VITALS — SYSTOLIC BLOOD PRESSURE: 120 MMHG | DIASTOLIC BLOOD PRESSURE: 62 MMHG

## 2023-03-26 VITALS — DIASTOLIC BLOOD PRESSURE: 73 MMHG | SYSTOLIC BLOOD PRESSURE: 137 MMHG

## 2023-03-26 VITALS — DIASTOLIC BLOOD PRESSURE: 73 MMHG | SYSTOLIC BLOOD PRESSURE: 127 MMHG

## 2023-03-26 DIAGNOSIS — Z79.899: ICD-10-CM

## 2023-03-26 DIAGNOSIS — Z3A.38: ICD-10-CM

## 2023-03-26 DIAGNOSIS — Z79.82: ICD-10-CM

## 2023-03-26 DIAGNOSIS — Z62.810: ICD-10-CM

## 2023-03-26 DIAGNOSIS — F60.89: ICD-10-CM

## 2023-03-26 DIAGNOSIS — Z62.811: ICD-10-CM

## 2023-03-26 DIAGNOSIS — F43.10: ICD-10-CM

## 2023-03-26 LAB
ALT SERPL W P-5'-P-CCNC: 9 U/L (ref 7–40)
AST SERPL-CCNC: < 8 U/L (ref ?–34)
BILIRUB SERPL-MCNC: 0.2 MG/DL (ref 0.3–1.2)
CREAT SERPL-MCNC: 0.55 MG/DL (ref 0.55–1.3)
GFR SERPL CREATININE-BSD FRML MDRD: > 60 ML/MIN/{1.73_M2} (ref 60–?)
HCT VFR BLD AUTO: 36.6 % (ref 36–47)
HGB BLD-MCNC: 11.8 G/DL (ref 12–15.5)
LDH SERPL L TO P-CCNC: 184 U/L (ref 120–246)
MCH RBC QN AUTO: 22.1 PG (ref 27–33)
MCHC RBC AUTO-ENTMCNC: 32.2 G/DL (ref 32–36.5)
MCV RBC AUTO: 68.7 FL (ref 80–96)
PLATELET # BLD AUTO: 304 10^3/UL (ref 150–450)
RBC # BLD AUTO: 5.33 10^6/UL (ref 4–5.4)
URATE SERPL-MCNC: 7 MG/DL (ref 3.1–7.8)
WBC # BLD AUTO: 10 10^3/UL (ref 4–10)

## 2023-03-26 PROCEDURE — 3E033VJ INTRODUCTION OF OTHER HORMONE INTO PERIPHERAL VEIN, PERCUTANEOUS APPROACH: ICD-10-PCS | Performed by: OBSTETRICS & GYNECOLOGY

## 2023-03-26 RX ADMIN — MISOPROSTOL SCH MCG: 100 TABLET ORAL at 20:19

## 2023-03-27 VITALS — SYSTOLIC BLOOD PRESSURE: 132 MMHG | DIASTOLIC BLOOD PRESSURE: 71 MMHG

## 2023-03-27 VITALS — DIASTOLIC BLOOD PRESSURE: 73 MMHG | SYSTOLIC BLOOD PRESSURE: 141 MMHG

## 2023-03-27 VITALS — SYSTOLIC BLOOD PRESSURE: 154 MMHG | DIASTOLIC BLOOD PRESSURE: 71 MMHG

## 2023-03-27 VITALS — SYSTOLIC BLOOD PRESSURE: 149 MMHG | DIASTOLIC BLOOD PRESSURE: 88 MMHG

## 2023-03-27 VITALS — SYSTOLIC BLOOD PRESSURE: 130 MMHG | DIASTOLIC BLOOD PRESSURE: 62 MMHG

## 2023-03-27 VITALS — DIASTOLIC BLOOD PRESSURE: 88 MMHG | SYSTOLIC BLOOD PRESSURE: 185 MMHG

## 2023-03-27 VITALS — SYSTOLIC BLOOD PRESSURE: 130 MMHG | DIASTOLIC BLOOD PRESSURE: 65 MMHG

## 2023-03-27 VITALS — DIASTOLIC BLOOD PRESSURE: 77 MMHG | SYSTOLIC BLOOD PRESSURE: 160 MMHG

## 2023-03-27 VITALS — DIASTOLIC BLOOD PRESSURE: 71 MMHG | SYSTOLIC BLOOD PRESSURE: 132 MMHG

## 2023-03-27 VITALS — DIASTOLIC BLOOD PRESSURE: 78 MMHG | SYSTOLIC BLOOD PRESSURE: 149 MMHG

## 2023-03-27 VITALS — SYSTOLIC BLOOD PRESSURE: 138 MMHG | DIASTOLIC BLOOD PRESSURE: 66 MMHG

## 2023-03-27 VITALS — SYSTOLIC BLOOD PRESSURE: 141 MMHG | DIASTOLIC BLOOD PRESSURE: 79 MMHG

## 2023-03-27 VITALS — SYSTOLIC BLOOD PRESSURE: 159 MMHG | DIASTOLIC BLOOD PRESSURE: 101 MMHG

## 2023-03-27 PROCEDURE — 0KQM0ZZ REPAIR PERINEUM MUSCLE, OPEN APPROACH: ICD-10-PCS | Performed by: OBSTETRICS & GYNECOLOGY

## 2023-03-27 RX ADMIN — ACETAMINOPHEN PRN MG: 500 TABLET ORAL at 03:52

## 2023-03-27 RX ADMIN — LABETALOL HCL SCH MG: 200 TABLET, FILM COATED ORAL at 03:38

## 2023-03-27 RX ADMIN — LABETALOL HCL SCH MG: 200 TABLET, FILM COATED ORAL at 21:55

## 2023-03-27 RX ADMIN — MISOPROSTOL SCH MCG: 100 TABLET ORAL at 00:16

## 2023-03-27 RX ADMIN — ACETAMINOPHEN PRN MG: 500 TABLET ORAL at 06:36

## 2023-03-27 RX ADMIN — Medication SCH TAB: at 11:03

## 2023-03-28 VITALS — SYSTOLIC BLOOD PRESSURE: 128 MMHG | DIASTOLIC BLOOD PRESSURE: 65 MMHG

## 2023-03-28 RX ADMIN — LABETALOL HCL SCH MG: 200 TABLET, FILM COATED ORAL at 08:14

## 2023-03-28 RX ADMIN — Medication SCH TAB: at 08:14

## 2023-04-18 ENCOUNTER — HOSPITAL ENCOUNTER (OUTPATIENT)
Dept: HOSPITAL 53 - M LAB REF | Age: 33
End: 2023-04-18
Attending: PHYSICIAN ASSISTANT
Payer: COMMERCIAL

## 2023-04-18 DIAGNOSIS — E04.9: Primary | ICD-10-CM

## 2023-08-26 ENCOUNTER — HOSPITAL ENCOUNTER (EMERGENCY)
Dept: HOSPITAL 53 - M ED | Age: 33
Discharge: HOME | End: 2023-08-26
Payer: COMMERCIAL

## 2023-08-26 VITALS — HEIGHT: 64 IN | BODY MASS INDEX: 32.18 KG/M2 | WEIGHT: 188.5 LBS

## 2023-08-26 VITALS — OXYGEN SATURATION: 98 % | SYSTOLIC BLOOD PRESSURE: 171 MMHG | DIASTOLIC BLOOD PRESSURE: 98 MMHG

## 2023-08-26 VITALS — TEMPERATURE: 97.8 F

## 2023-08-26 DIAGNOSIS — F41.9: ICD-10-CM

## 2023-08-26 DIAGNOSIS — F31.9: ICD-10-CM

## 2023-08-26 DIAGNOSIS — F32.A: ICD-10-CM

## 2023-08-26 DIAGNOSIS — F43.9: ICD-10-CM

## 2023-08-26 DIAGNOSIS — R45.851: Primary | ICD-10-CM

## 2023-08-26 DIAGNOSIS — Z79.899: ICD-10-CM

## 2023-08-26 LAB
ALBUMIN SERPL BCG-MCNC: 3.9 G/DL (ref 3.2–5.2)
ALP SERPL-CCNC: 61 U/L (ref 46–116)
ALT SERPL W P-5'-P-CCNC: 20 U/L (ref 7–40)
AMPHETAMINES UR QL SCN: NEGATIVE
APAP SERPL-MCNC: < 2 UG/ML (ref 10–20)
AST SERPL-CCNC: 16 U/L (ref ?–34)
B-HCG SERPL QL: NEGATIVE
BARBITURATES UR QL SCN: NEGATIVE
BENZODIAZ UR QL SCN: NEGATIVE
BILIRUB CONJ SERPL-MCNC: 0.1 MG/DL (ref ?–0.4)
BILIRUB SERPL-MCNC: 0.3 MG/DL (ref 0.3–1.2)
BUN SERPL-MCNC: 11 MG/DL (ref 9–23)
BZE UR QL SCN: NEGATIVE
CALCIUM SERPL-MCNC: 9.6 MG/DL (ref 8.5–10.1)
CANNABINOIDS UR QL SCN: NEGATIVE
CHLORIDE SERPL-SCNC: 107 MMOL/L (ref 98–107)
CO2 SERPL-SCNC: 26 MMOL/L (ref 20–31)
CREAT SERPL-MCNC: 0.72 MG/DL (ref 0.55–1.3)
ETHANOL SERPL-MCNC: < 0.003 % (ref 0–0.01)
GFR SERPL CREATININE-BSD FRML MDRD: > 60 ML/MIN/{1.73_M2} (ref 60–?)
GLUCOSE SERPL-MCNC: 84 MG/DL (ref 60–100)
HCT VFR BLD AUTO: 38.9 % (ref 36–47)
HGB BLD-MCNC: 11.7 G/DL (ref 12–15.5)
MCH RBC QN AUTO: 20 PG (ref 27–33)
MCHC RBC AUTO-ENTMCNC: 30.1 G/DL (ref 32–36.5)
MCV RBC AUTO: 66.6 FL (ref 80–96)
METHADONE UR QL SCN: NEGATIVE
OPIATES UR QL SCN: NEGATIVE
PCP UR QL SCN: NEGATIVE
PLATELET # BLD AUTO: 406 10^3/UL (ref 150–450)
POTASSIUM SERPL-SCNC: 4.2 MMOL/L (ref 3.5–5.1)
PROT SERPL-MCNC: 7.4 G/DL (ref 5.7–8.2)
RBC # BLD AUTO: 5.84 10^6/UL (ref 4–5.4)
SALICYLATES SERPL-MCNC: < 3 MG/DL (ref ?–30)
SODIUM SERPL-SCNC: 141 MMOL/L (ref 136–145)
TSH SERPL DL<=0.005 MIU/L-ACNC: 1.29 UIU/ML (ref 0.55–4.78)
WBC # BLD AUTO: 10.9 10^3/UL (ref 4–10)

## 2023-12-06 ENCOUNTER — HOSPITAL ENCOUNTER (EMERGENCY)
Dept: HOSPITAL 53 - M ED | Age: 33
LOS: 1 days | Discharge: PSYCHIATRIC OR SUBSTANCE USE FACILITY | End: 2023-12-07
Payer: COMMERCIAL

## 2023-12-06 DIAGNOSIS — F32.A: ICD-10-CM

## 2023-12-06 DIAGNOSIS — R45.851: Primary | ICD-10-CM

## 2023-12-06 DIAGNOSIS — F20.9: ICD-10-CM

## 2023-12-06 DIAGNOSIS — Z79.899: ICD-10-CM

## 2023-12-06 DIAGNOSIS — F31.9: ICD-10-CM

## 2023-12-06 DIAGNOSIS — S51.812A: ICD-10-CM

## 2023-12-07 VITALS — DIASTOLIC BLOOD PRESSURE: 99 MMHG | SYSTOLIC BLOOD PRESSURE: 149 MMHG | OXYGEN SATURATION: 98 %

## 2023-12-07 VITALS — TEMPERATURE: 97.8 F

## 2023-12-07 LAB
ALBUMIN SERPL BCG-MCNC: 4.1 G/DL (ref 3.2–5.2)
ALP SERPL-CCNC: 61 U/L (ref 46–116)
ALT SERPL W P-5'-P-CCNC: 20 U/L (ref 7–40)
AMPHETAMINES UR QL SCN: NEGATIVE
APPEARANCE UR: (no result)
AST SERPL-CCNC: 13 U/L (ref ?–34)
B-HCG SERPL QL: NEGATIVE
BACTERIA UR QL AUTO: NEGATIVE
BARBITURATES UR QL SCN: NEGATIVE
BENZODIAZ UR QL SCN: NEGATIVE
BILIRUB CONJ SERPL-MCNC: < 0.1 MG/DL (ref ?–0.4)
BILIRUB SERPL-MCNC: 0.3 MG/DL (ref 0.3–1.2)
BILIRUB UR QL STRIP.AUTO: NEGATIVE
BUN SERPL-MCNC: 23 MG/DL (ref 9–23)
BZE UR QL SCN: NEGATIVE
CALCIUM SERPL-MCNC: 9.3 MG/DL (ref 8.5–10.1)
CANNABINOIDS UR QL SCN: NEGATIVE
CHLORIDE SERPL-SCNC: 104 MMOL/L (ref 98–107)
CO2 SERPL-SCNC: 23 MMOL/L (ref 20–31)
CREAT SERPL-MCNC: 0.88 MG/DL (ref 0.55–1.3)
ETHANOL SERPL-MCNC: < 0.003 % (ref 0–0.01)
GFR SERPL CREATININE-BSD FRML MDRD: > 60 ML/MIN/{1.73_M2} (ref 60–?)
GLUCOSE SERPL-MCNC: 95 MG/DL (ref 60–100)
GLUCOSE UR QL STRIP.AUTO: NEGATIVE MG/DL
HCT VFR BLD AUTO: 38.8 % (ref 36–47)
HGB BLD-MCNC: 12.3 G/DL (ref 12–15.5)
HGB UR QL STRIP.AUTO: (no result)
KETONES UR QL STRIP.AUTO: NEGATIVE MG/DL
LEUKOCYTE ESTERASE UR QL STRIP.AUTO: (no result)
MCH RBC QN AUTO: 21.8 PG (ref 27–33)
MCHC RBC AUTO-ENTMCNC: 31.7 G/DL (ref 32–36.5)
MCV RBC AUTO: 68.8 FL (ref 80–96)
METHADONE UR QL SCN: NEGATIVE
NITRITE UR QL STRIP.AUTO: NEGATIVE
OPIATES UR QL SCN: NEGATIVE
PCP UR QL SCN: NEGATIVE
PH UR STRIP.AUTO: 7 UNITS (ref 5–9)
PLATELET # BLD AUTO: 395 10^3/UL (ref 150–450)
POTASSIUM SERPL-SCNC: 4.1 MMOL/L (ref 3.5–5.1)
PROT SERPL-MCNC: 7.9 G/DL (ref 5.7–8.2)
PROT UR QL STRIP.AUTO: (no result) MG/DL
RBC # BLD AUTO: 5.64 10^6/UL (ref 4–5.4)
RBC # UR AUTO: (no result) /HPF (ref 0–3)
SALICYLATES SERPL-MCNC: < 3 MG/DL (ref ?–30)
SODIUM SERPL-SCNC: 137 MMOL/L (ref 136–145)
SP GR UR STRIP.AUTO: 1.03 (ref 1–1.03)
SQUAMOUS #/AREA URNS AUTO: 5 /HPF (ref 0–6)
TSH SERPL DL<=0.005 MIU/L-ACNC: 0.63 UIU/ML (ref 0.55–4.78)
UROBILINOGEN UR QL STRIP.AUTO: 0.2 MG/DL (ref 0–2)
WBC # BLD AUTO: 13.2 10^3/UL (ref 4–10)
WBC #/AREA URNS AUTO: 18 /HPF (ref 0–3)

## 2024-09-04 ENCOUNTER — HOSPITAL ENCOUNTER (EMERGENCY)
Dept: HOSPITAL 53 - M ED | Age: 34
Discharge: HOME | End: 2024-09-04
Payer: COMMERCIAL

## 2024-09-04 VITALS — BODY MASS INDEX: 34.97 KG/M2 | WEIGHT: 204.81 LBS | HEIGHT: 64 IN

## 2024-09-04 VITALS — DIASTOLIC BLOOD PRESSURE: 87 MMHG | TEMPERATURE: 97.4 F | OXYGEN SATURATION: 100 % | SYSTOLIC BLOOD PRESSURE: 141 MMHG

## 2024-09-04 DIAGNOSIS — Z79.899: ICD-10-CM

## 2024-09-04 DIAGNOSIS — Z76.0: Primary | ICD-10-CM

## 2024-10-31 ENCOUNTER — HOSPITAL ENCOUNTER (OUTPATIENT)
Dept: HOSPITAL 53 - M LAB | Age: 34
End: 2024-10-31
Attending: NURSE PRACTITIONER
Payer: COMMERCIAL

## 2024-10-31 DIAGNOSIS — Z71.89: Primary | ICD-10-CM

## 2024-10-31 LAB
25(OH)D3 SERPL-MCNC: 16.8 NG/ML (ref 20–100)
ALBUMIN SERPL BCG-MCNC: 3.9 G/DL (ref 3.2–5.2)
ALP SERPL-CCNC: 61 U/L (ref 35–104)
ALT SERPL W P-5'-P-CCNC: 14 U/L (ref 7–40)
AST SERPL-CCNC: 9 U/L (ref ?–34)
BASOPHILS # BLD AUTO: 0 10^3/UL (ref 0–0.2)
BASOPHILS NFR BLD AUTO: 0.3 % (ref 0–1)
BILIRUB SERPL-MCNC: 0.3 MG/DL (ref 0.3–1.2)
BUN SERPL-MCNC: 18 MG/DL (ref 9–23)
CALCIUM SERPL-MCNC: 9.9 MG/DL (ref 8.5–10.1)
CHLORIDE SERPL-SCNC: 106 MMOL/L (ref 98–107)
CHOLEST SERPL-MCNC: 122 MG/DL (ref ?–200)
CHOLEST/HDLC SERPL: 2.37 {RATIO} (ref ?–5)
CO2 SERPL-SCNC: 28 MMOL/L (ref 20–31)
CREAT SERPL-MCNC: 0.68 MG/DL (ref 0.55–1.3)
EOSINOPHIL # BLD AUTO: 0 10^3/UL (ref 0–0.5)
EOSINOPHIL NFR BLD AUTO: 0.1 % (ref 0–3)
EST. AVERAGE GLUCOSE BLD GHB EST-MCNC: 105 MG/DL (ref 60–110)
GFR SERPL CREATININE-BSD FRML MDRD: > 60 ML/MIN/{1.73_M2} (ref 60–?)
GLUCOSE SERPL-MCNC: 85 MG/DL (ref 60–100)
HCT VFR BLD AUTO: 40.4 % (ref 36–47)
HDLC SERPL-MCNC: 51.3 MG/DL (ref 40–?)
HGB BLD-MCNC: 12.1 G/DL (ref 12–15.5)
LDLC SERPL CALC-MCNC: 62.5 MG/DL (ref ?–100)
LYMPHOCYTES # BLD AUTO: 2.5 10^3/UL (ref 1.5–5)
LYMPHOCYTES NFR BLD AUTO: 27 % (ref 24–44)
MCH RBC QN AUTO: 19.7 PG (ref 27–33)
MCHC RBC AUTO-ENTMCNC: 30 G/DL (ref 32–36.5)
MCV RBC AUTO: 65.9 FL (ref 80–96)
MONOCYTES # BLD AUTO: 0.5 10^3/UL (ref 0–0.8)
MONOCYTES NFR BLD AUTO: 5.7 % (ref 2–8)
NEUTROPHILS # BLD AUTO: 6.1 10^3/UL (ref 1.5–8.5)
NEUTROPHILS NFR BLD AUTO: 66.4 % (ref 36–66)
NONHDLC SERPL-MCNC: 70.7 MG/DL
PLATELET # BLD AUTO: 364 10^3/UL (ref 150–450)
POTASSIUM SERPL-SCNC: 4.2 MMOL/L (ref 3.5–5.1)
PROT SERPL-MCNC: 7.7 G/DL (ref 5.7–8.2)
RBC # BLD AUTO: 6.13 10^6/UL (ref 4–5.4)
SODIUM SERPL-SCNC: 138 MMOL/L (ref 136–145)
TRIGL SERPL-MCNC: 41 MG/DL (ref ?–150)
TSH SERPL DL<=0.005 MIU/L-ACNC: 0.9 UIU/ML (ref 0.55–4.78)
WBC # BLD AUTO: 9.1 10^3/UL (ref 4–10)